# Patient Record
Sex: FEMALE | Race: WHITE | HISPANIC OR LATINO | Employment: OTHER | URBAN - METROPOLITAN AREA
[De-identification: names, ages, dates, MRNs, and addresses within clinical notes are randomized per-mention and may not be internally consistent; named-entity substitution may affect disease eponyms.]

---

## 2023-06-13 ENCOUNTER — OFFICE VISIT (OUTPATIENT)
Dept: PRIMARY CARE | Facility: CLINIC | Age: 88
End: 2023-06-13
Payer: MEDICARE

## 2023-06-13 VITALS
WEIGHT: 111 LBS | TEMPERATURE: 98.4 F | DIASTOLIC BLOOD PRESSURE: 80 MMHG | BODY MASS INDEX: 21.68 KG/M2 | SYSTOLIC BLOOD PRESSURE: 122 MMHG

## 2023-06-13 DIAGNOSIS — J03.90 TONSILLITIS: Primary | ICD-10-CM

## 2023-06-13 PROCEDURE — 1159F MED LIST DOCD IN RCRD: CPT | Performed by: FAMILY MEDICINE

## 2023-06-13 PROCEDURE — 99213 OFFICE O/P EST LOW 20 MIN: CPT | Performed by: FAMILY MEDICINE

## 2023-06-13 RX ORDER — ATORVASTATIN CALCIUM 20 MG/1
TABLET, FILM COATED ORAL
COMMUNITY
Start: 2020-11-11 | End: 2023-07-11 | Stop reason: SDUPTHER

## 2023-06-13 RX ORDER — HYDROCHLOROTHIAZIDE 25 MG/1
TABLET ORAL
COMMUNITY
Start: 2016-03-04 | End: 2023-07-11 | Stop reason: SDUPTHER

## 2023-06-13 RX ORDER — CELECOXIB 200 MG/1
200 CAPSULE ORAL 2 TIMES DAILY
Qty: 60 CAPSULE | Refills: 0 | Status: SHIPPED | OUTPATIENT
Start: 2023-06-13 | End: 2023-07-11 | Stop reason: ALTCHOICE

## 2023-06-13 RX ORDER — NIFEDIPINE 30 MG/1
1 TABLET, FILM COATED, EXTENDED RELEASE ORAL 2 TIMES DAILY
COMMUNITY
Start: 2018-06-29 | End: 2024-01-16 | Stop reason: ALTCHOICE

## 2023-06-13 RX ORDER — LOSARTAN POTASSIUM 100 MG/1
TABLET ORAL
COMMUNITY
Start: 2017-02-03 | End: 2023-07-11 | Stop reason: SDUPTHER

## 2023-06-13 RX ORDER — CELECOXIB 200 MG/1
CAPSULE ORAL
COMMUNITY
Start: 2023-02-01 | End: 2023-07-11 | Stop reason: ALTCHOICE

## 2023-06-13 NOTE — PROGRESS NOTES
Subjective   Patient ID: Rizwana Barton is a 89 y.o. female who presents for Sore Throat (EP. Here for sore throat and tonsils on right side. States has gone into ear.).  HPI  When swallows hurts throat and radiates to right ear, had this 3 weeks ago and went away but now back  No fever or cough    Objective   Visit Vitals  /80   Temp 36.9 °C (98.4 °F) (Oral)      Physical Exam  Alert, well-appearing.    HEENT: OP clear but broken tooth right back molar.  EACs and TMs clear.    Neck: Supple. No palpable masses or LAD.    CVS: RRR, no murmurs.     Respiratory: Normal inspirations and expirations. Clear and equal breath sounds.     Assessment/Plan   Diagnoses and all orders for this visit:  Tonsillitis  -     celecoxib (CeleBREX) 200 mg capsule; Take 1 capsule (200 mg) by mouth 2 times a day.    Patient is allergic to PCN (and also thinks she can not take amoxicillin) so I am leary to prescribe an antibiotic that I don't feel she absolutely needs, recommended NSAID (called in Celebrex for her) and salt water gargles and dental appointment ASAP      Mercedez Georges MD  Family Medicine   [facility]

## 2023-06-28 DIAGNOSIS — E78.5 HYPERLIPIDEMIA, UNSPECIFIED HYPERLIPIDEMIA TYPE: ICD-10-CM

## 2023-06-28 DIAGNOSIS — I10 HYPERTENSION, UNSPECIFIED TYPE: ICD-10-CM

## 2023-06-28 DIAGNOSIS — N18.30 STAGE 3 CHRONIC KIDNEY DISEASE, UNSPECIFIED WHETHER STAGE 3A OR 3B CKD (MULTI): ICD-10-CM

## 2023-06-28 DIAGNOSIS — Z00.00 MEDICARE ANNUAL WELLNESS VISIT, SUBSEQUENT: ICD-10-CM

## 2023-07-07 ENCOUNTER — LAB (OUTPATIENT)
Dept: LAB | Facility: LAB | Age: 88
End: 2023-07-07
Payer: MEDICARE

## 2023-07-07 DIAGNOSIS — N18.30 STAGE 3 CHRONIC KIDNEY DISEASE, UNSPECIFIED WHETHER STAGE 3A OR 3B CKD (MULTI): ICD-10-CM

## 2023-07-07 DIAGNOSIS — E78.5 HYPERLIPIDEMIA, UNSPECIFIED HYPERLIPIDEMIA TYPE: ICD-10-CM

## 2023-07-07 DIAGNOSIS — I10 HYPERTENSION, UNSPECIFIED TYPE: ICD-10-CM

## 2023-07-07 DIAGNOSIS — Z00.00 MEDICARE ANNUAL WELLNESS VISIT, SUBSEQUENT: ICD-10-CM

## 2023-07-07 LAB
ALANINE AMINOTRANSFERASE (SGPT) (U/L) IN SER/PLAS: 36 U/L (ref 7–45)
ALBUMIN (G/DL) IN SER/PLAS: 4.1 G/DL (ref 3.4–5)
ALKALINE PHOSPHATASE (U/L) IN SER/PLAS: 114 U/L (ref 33–136)
ANION GAP IN SER/PLAS: 11 MMOL/L (ref 10–20)
ASPARTATE AMINOTRANSFERASE (SGOT) (U/L) IN SER/PLAS: 32 U/L (ref 9–39)
BASOPHILS (10*3/UL) IN BLOOD BY AUTOMATED COUNT: 0.05 X10E9/L (ref 0–0.1)
BASOPHILS/100 LEUKOCYTES IN BLOOD BY AUTOMATED COUNT: 0.4 % (ref 0–2)
BILIRUBIN TOTAL (MG/DL) IN SER/PLAS: 0.9 MG/DL (ref 0–1.2)
CALCIUM (MG/DL) IN SER/PLAS: 9.7 MG/DL (ref 8.6–10.6)
CARBON DIOXIDE, TOTAL (MMOL/L) IN SER/PLAS: 30 MMOL/L (ref 21–32)
CHLORIDE (MMOL/L) IN SER/PLAS: 103 MMOL/L (ref 98–107)
CHOLESTEROL (MG/DL) IN SER/PLAS: 159 MG/DL (ref 0–199)
CHOLESTEROL IN HDL (MG/DL) IN SER/PLAS: 51.8 MG/DL
CHOLESTEROL/HDL RATIO: 3.1
CREATININE (MG/DL) IN SER/PLAS: 1.21 MG/DL (ref 0.5–1.05)
EOSINOPHILS (10*3/UL) IN BLOOD BY AUTOMATED COUNT: 0.18 X10E9/L (ref 0–0.4)
EOSINOPHILS/100 LEUKOCYTES IN BLOOD BY AUTOMATED COUNT: 1.6 % (ref 0–6)
ERYTHROCYTE DISTRIBUTION WIDTH (RATIO) BY AUTOMATED COUNT: 13.6 % (ref 11.5–14.5)
ERYTHROCYTE MEAN CORPUSCULAR HEMOGLOBIN CONCENTRATION (G/DL) BY AUTOMATED: 31.4 G/DL (ref 32–36)
ERYTHROCYTE MEAN CORPUSCULAR VOLUME (FL) BY AUTOMATED COUNT: 95 FL (ref 80–100)
ERYTHROCYTES (10*6/UL) IN BLOOD BY AUTOMATED COUNT: 4.42 X10E12/L (ref 4–5.2)
GFR FEMALE: 42 ML/MIN/1.73M2
GLUCOSE (MG/DL) IN SER/PLAS: 104 MG/DL (ref 74–99)
HEMATOCRIT (%) IN BLOOD BY AUTOMATED COUNT: 42.1 % (ref 36–46)
HEMOGLOBIN (G/DL) IN BLOOD: 13.2 G/DL (ref 12–16)
IMMATURE GRANULOCYTES/100 LEUKOCYTES IN BLOOD BY AUTOMATED COUNT: 0.3 % (ref 0–0.9)
LDL: 81 MG/DL (ref 0–99)
LEUKOCYTES (10*3/UL) IN BLOOD BY AUTOMATED COUNT: 11.2 X10E9/L (ref 4.4–11.3)
LYMPHOCYTES (10*3/UL) IN BLOOD BY AUTOMATED COUNT: 1.02 X10E9/L (ref 0.8–3)
LYMPHOCYTES/100 LEUKOCYTES IN BLOOD BY AUTOMATED COUNT: 9.1 % (ref 13–44)
MONOCYTES (10*3/UL) IN BLOOD BY AUTOMATED COUNT: 1.04 X10E9/L (ref 0.05–0.8)
MONOCYTES/100 LEUKOCYTES IN BLOOD BY AUTOMATED COUNT: 9.3 % (ref 2–10)
NEUTROPHILS (10*3/UL) IN BLOOD BY AUTOMATED COUNT: 8.89 X10E9/L (ref 1.6–5.5)
NEUTROPHILS/100 LEUKOCYTES IN BLOOD BY AUTOMATED COUNT: 79.3 % (ref 40–80)
NRBC (PER 100 WBCS) BY AUTOMATED COUNT: 0 /100 WBC (ref 0–0)
PLATELETS (10*3/UL) IN BLOOD AUTOMATED COUNT: 277 X10E9/L (ref 150–450)
POTASSIUM (MMOL/L) IN SER/PLAS: 4 MMOL/L (ref 3.5–5.3)
PROTEIN TOTAL: 6.7 G/DL (ref 6.4–8.2)
SODIUM (MMOL/L) IN SER/PLAS: 140 MMOL/L (ref 136–145)
THYROTROPIN (MIU/L) IN SER/PLAS BY DETECTION LIMIT <= 0.05 MIU/L: 2.09 MIU/L (ref 0.44–3.98)
TRIGLYCERIDE (MG/DL) IN SER/PLAS: 130 MG/DL (ref 0–149)
UREA NITROGEN (MG/DL) IN SER/PLAS: 22 MG/DL (ref 6–23)
VLDL: 26 MG/DL (ref 0–40)

## 2023-07-07 PROCEDURE — 84443 ASSAY THYROID STIM HORMONE: CPT

## 2023-07-07 PROCEDURE — 80061 LIPID PANEL: CPT

## 2023-07-07 PROCEDURE — 36415 COLL VENOUS BLD VENIPUNCTURE: CPT

## 2023-07-07 PROCEDURE — 80053 COMPREHEN METABOLIC PANEL: CPT

## 2023-07-07 PROCEDURE — 85025 COMPLETE CBC W/AUTO DIFF WBC: CPT

## 2023-07-11 ENCOUNTER — OFFICE VISIT (OUTPATIENT)
Dept: PRIMARY CARE | Facility: CLINIC | Age: 88
End: 2023-07-11
Payer: MEDICARE

## 2023-07-11 VITALS
DIASTOLIC BLOOD PRESSURE: 84 MMHG | HEIGHT: 61 IN | BODY MASS INDEX: 20.39 KG/M2 | SYSTOLIC BLOOD PRESSURE: 124 MMHG | TEMPERATURE: 97.9 F | WEIGHT: 108 LBS

## 2023-07-11 DIAGNOSIS — Z00.00 HEALTHCARE MAINTENANCE: Primary | ICD-10-CM

## 2023-07-11 DIAGNOSIS — I10 HYPERTENSION, UNSPECIFIED TYPE: ICD-10-CM

## 2023-07-11 DIAGNOSIS — Z12.31 BREAST CANCER SCREENING BY MAMMOGRAM: ICD-10-CM

## 2023-07-11 DIAGNOSIS — N18.32 STAGE 3B CHRONIC KIDNEY DISEASE (MULTI): ICD-10-CM

## 2023-07-11 DIAGNOSIS — E78.5 HYPERLIPIDEMIA, UNSPECIFIED HYPERLIPIDEMIA TYPE: ICD-10-CM

## 2023-07-11 PROCEDURE — 3074F SYST BP LT 130 MM HG: CPT | Performed by: FAMILY MEDICINE

## 2023-07-11 PROCEDURE — 1036F TOBACCO NON-USER: CPT | Performed by: FAMILY MEDICINE

## 2023-07-11 PROCEDURE — 3079F DIAST BP 80-89 MM HG: CPT | Performed by: FAMILY MEDICINE

## 2023-07-11 PROCEDURE — 1159F MED LIST DOCD IN RCRD: CPT | Performed by: FAMILY MEDICINE

## 2023-07-11 PROCEDURE — 1170F FXNL STATUS ASSESSED: CPT | Performed by: FAMILY MEDICINE

## 2023-07-11 PROCEDURE — G0439 PPPS, SUBSEQ VISIT: HCPCS | Performed by: FAMILY MEDICINE

## 2023-07-11 PROCEDURE — 99213 OFFICE O/P EST LOW 20 MIN: CPT | Performed by: FAMILY MEDICINE

## 2023-07-11 RX ORDER — ATORVASTATIN CALCIUM 20 MG/1
TABLET, FILM COATED ORAL
Qty: 90 TABLET | Refills: 1 | Status: CANCELLED | OUTPATIENT
Start: 2023-07-11

## 2023-07-11 RX ORDER — LOSARTAN POTASSIUM 100 MG/1
TABLET ORAL
Qty: 90 TABLET | Refills: 1 | Status: CANCELLED | OUTPATIENT
Start: 2023-07-11

## 2023-07-11 RX ORDER — HYDROCHLOROTHIAZIDE 25 MG/1
25 TABLET ORAL DAILY
Qty: 90 TABLET | Refills: 1 | Status: SHIPPED | OUTPATIENT
Start: 2023-07-11 | End: 2024-01-16

## 2023-07-11 RX ORDER — ATORVASTATIN CALCIUM 20 MG/1
20 TABLET, FILM COATED ORAL DAILY
Qty: 90 TABLET | Refills: 1 | Status: SHIPPED | OUTPATIENT
Start: 2023-07-11 | End: 2024-01-16 | Stop reason: SDUPTHER

## 2023-07-11 RX ORDER — LOSARTAN POTASSIUM 100 MG/1
100 TABLET ORAL DAILY
Qty: 90 TABLET | Refills: 1 | Status: SHIPPED | OUTPATIENT
Start: 2023-07-11 | End: 2024-01-16 | Stop reason: SDUPTHER

## 2023-07-11 RX ORDER — HYDROCHLOROTHIAZIDE 25 MG/1
TABLET ORAL
Qty: 90 TABLET | Refills: 1 | Status: CANCELLED | OUTPATIENT
Start: 2023-07-11

## 2023-07-11 ASSESSMENT — PATIENT HEALTH QUESTIONNAIRE - PHQ9
1. LITTLE INTEREST OR PLEASURE IN DOING THINGS: NOT AT ALL
2. FEELING DOWN, DEPRESSED OR HOPELESS: NOT AT ALL
SUM OF ALL RESPONSES TO PHQ9 QUESTIONS 1 AND 2: 0

## 2023-07-11 ASSESSMENT — ACTIVITIES OF DAILY LIVING (ADL)
DRESSING: INDEPENDENT
BATHING: INDEPENDENT
GROCERY_SHOPPING: INDEPENDENT
TAKING_MEDICATION: INDEPENDENT
DOING_HOUSEWORK: INDEPENDENT
MANAGING_FINANCES: INDEPENDENT

## 2023-07-11 NOTE — PROGRESS NOTES
Subjective   Patient ID: Rizwana Barton is a 89 y.o. female who presents for Medicare Annual Wellness Visit Subsequent (EP. Here for AMCR.).  HPI  Feels well, no specific complaints or concerns today except for arthritis    Review of Systems  General: no fever or night sweats, no change in weight  Eyes: no vision disturbance  ENT:  no mouth lesions, no sore throat, and no dysphagia  CV: no chest pain, no palpitations, no lower extremity edema  Resp: no shortness of breath, no cough  GI: no abdominal pain, no change in bowel habits  : no urinary problems  Skin; no rashes or new/changed skin lesions  Neuro: no headaches     Objective   There were no vitals taken for this visit.   Physical Exam  Alert, well-appearing.    HEENT: OP clear. Sclera white. Pupils equal and round. EACs and TMs clear.    Neck: Supple. No palpable masses. Thyroid was not enlarged.    CVS: RRR, no murmurs. No peripheral edema.    Respiratory: Normal inspirations and expirations. Clear and equal breath sounds.    GI: Soft, nontender, no masses or hepatosplenomegaly.     Assessment/Plan   Diagnoses and all orders for this visit:  Healthcare maintenance  Hyperlipidemia, unspecified hyperlipidemia type  Hypertension, unspecified type  Stage 3b chronic kidney disease    Follow up 6 mos     Mercedez Georges MD  Family Medicine   [facility]

## 2023-11-18 ENCOUNTER — TELEPHONE (OUTPATIENT)
Dept: PHARMACY | Facility: HOSPITAL | Age: 88
End: 2023-11-18
Payer: MEDICARE

## 2023-11-18 NOTE — TELEPHONE ENCOUNTER
Population Health: Outreach by Ambulatory Pharmacy Team    Payor: [employer] [place]  Reason: Adherence  Medication: Losartan 100 mg and Atorvastatin 20 mg   Outcome: No Answer/Invalid Number    Estefani Jordan, PharmD

## 2024-01-05 ENCOUNTER — OFFICE VISIT (OUTPATIENT)
Dept: PRIMARY CARE | Facility: CLINIC | Age: 89
End: 2024-01-05
Payer: MEDICARE

## 2024-01-05 VITALS
SYSTOLIC BLOOD PRESSURE: 120 MMHG | BODY MASS INDEX: 19.27 KG/M2 | DIASTOLIC BLOOD PRESSURE: 82 MMHG | WEIGHT: 102 LBS | TEMPERATURE: 97.8 F

## 2024-01-05 DIAGNOSIS — N18.32 STAGE 3B CHRONIC KIDNEY DISEASE (MULTI): ICD-10-CM

## 2024-01-05 DIAGNOSIS — M25.511 ACUTE PAIN OF RIGHT SHOULDER: ICD-10-CM

## 2024-01-05 DIAGNOSIS — R55 SYNCOPE AND COLLAPSE: Primary | ICD-10-CM

## 2024-01-05 PROCEDURE — 1125F AMNT PAIN NOTED PAIN PRSNT: CPT | Performed by: FAMILY MEDICINE

## 2024-01-05 PROCEDURE — 1036F TOBACCO NON-USER: CPT | Performed by: FAMILY MEDICINE

## 2024-01-05 PROCEDURE — 99214 OFFICE O/P EST MOD 30 MIN: CPT | Performed by: FAMILY MEDICINE

## 2024-01-05 PROCEDURE — 1159F MED LIST DOCD IN RCRD: CPT | Performed by: FAMILY MEDICINE

## 2024-01-05 ASSESSMENT — PAIN SCALES - GENERAL: PAINLEVEL: 8

## 2024-01-05 NOTE — PROGRESS NOTES
Subjective   Patient ID: Rizwana Barton is a 89 y.o. female who presents for Shoulder Pain (EP. Here for Right shoulder pain.  Has had 2 falls recently on Wednesday and Today.).  HPI  Sick with flu and cough, sore throat, and fever last week, feels better  Passed out Wednesday and fell, hurt right shoulder, happened again last night when got up to use the bathroom    Can't life up her arm at all, very painful  No appetite for the next few days    Objective   Visit Vitals  /82   Temp 36.6 °C (97.8 °F) (Oral)      Physical Exam  Alert, no acute distress but uncomfortable/in pain especially with exam of right shoulder     Neck: Supple. No LAD.     CVS: RRR, no murmurs.     Respiratory: Normal inspirations and expirations. Clear and equal breath sounds.     Assessment/Plan   Diagnoses and all orders for this visit:  Syncope and collapse  Acute pain of right shoulder      To ER for further evaluation    Hold nifedine and only take 1/2 losartan 100 mg (has lost weight- 6# over the last 6 mos and maybe having episodes of hypotension)    Mercedez Georges MD  Family Medicine   [facility]

## 2024-01-09 ENCOUNTER — PATIENT OUTREACH (OUTPATIENT)
Dept: PRIMARY CARE | Facility: CLINIC | Age: 89
End: 2024-01-09
Payer: MEDICARE

## 2024-01-09 RX ORDER — AMLODIPINE BESYLATE 2.5 MG/1
2.5 TABLET ORAL
COMMUNITY
Start: 2024-01-09 | End: 2024-01-16 | Stop reason: SDUPTHER

## 2024-01-09 RX ORDER — ACETAMINOPHEN 325 MG/1
650 TABLET ORAL EVERY 6 HOURS PRN
COMMUNITY
Start: 2019-05-08

## 2024-01-09 RX ORDER — SULFAMETHOXAZOLE AND TRIMETHOPRIM 800; 160 MG/1; MG/1
1 TABLET ORAL
COMMUNITY
Start: 2024-01-09 | End: 2024-01-12

## 2024-01-09 NOTE — PROGRESS NOTES
Discharge Facility: [facility]  Discharge Diagnosis: Syncope  Admission Date: 1/5/24  Discharge Date:  1/8/24    PCP Appointment Date: 1/16/24  Specialist Appointment Date:   - Dr. Tomas Anderson (Ortho): 1/30/23 for clavicle fracture  Hospital Encounter and Summary: Linked   See discharge assessment below for further details    Medications  Medications reviewed with patient/caregiver?: Yes (see med list) (1/9/2024  1:50 PM)  Is the patient having any side effects they believe may be caused by any medication additions or changes?: No (1/9/2024  1:50 PM)  Does the patient have all medications ordered at discharge?: Yes (1/9/2024  1:50 PM)  Care Management Interventions: No intervention needed (1/9/2024  1:50 PM)  Prescription Comments: START: amlodipine 2.5mg daily, bactrim DS x3 days  CHANGE: losartan 100mg daily  STOP: hydrochlorothiazide 25mg daily (1/9/2024  1:50 PM)  Is the patient taking all medications as directed (includes completed medication regime)?: Yes (1/9/2024  1:50 PM)  Care Management Interventions: Provided patient education (1/9/2024  1:50 PM)    Appointments  Does the patient have a primary care provider?: Yes (1/9/2024  1:50 PM)  Care Management Interventions: Verified appointment date/time/provider (1/9/2024  1:50 PM)  Care Management Interventions: Advised patient to keep appointment (1/9/2024  1:50 PM)    Self Management  Has home health visited the patient within 72 hours of discharge?: Not applicable (1/9/2024  1:50 PM)    Patient Teaching  Does the patient have access to their discharge instructions?: Yes (1/9/2024  1:50 PM)  Care Management Interventions: Reviewed instructions with patient (1/9/2024  1:50 PM)  What is the patient's perception of their health status since discharge?: Improving (1/9/2024  1:50 PM)  Is the patient/caregiver able to teach back the hierarchy of who to call/visit for symptoms/problems? PCP, Specialist, Home Health nurse, Urgent Care, ED, 911: Yes (1/9/2024  1:50  PM)  Patient/Caregiver Education Comments: Spoke with patient daughter Salo who reports patient is doing better. She has her arm in a sling and is scheduled to follow up with ortho at the end of the month. States her breathing has been fine and she wasnt showing symptoms of covid. States she had a fever prior to going into the hospital but they were shocked she tested positive for covid. Assisted to scheudule follow up with PCP. Denies additonal needs at this time. (1/9/2024  1:50 PM)

## 2024-01-16 ENCOUNTER — OFFICE VISIT (OUTPATIENT)
Dept: PRIMARY CARE | Facility: CLINIC | Age: 89
End: 2024-01-16
Payer: MEDICARE

## 2024-01-16 VITALS
WEIGHT: 106 LBS | DIASTOLIC BLOOD PRESSURE: 82 MMHG | BODY MASS INDEX: 20.03 KG/M2 | TEMPERATURE: 98.2 F | SYSTOLIC BLOOD PRESSURE: 128 MMHG

## 2024-01-16 DIAGNOSIS — N30.00 ACUTE CYSTITIS WITHOUT HEMATURIA: ICD-10-CM

## 2024-01-16 DIAGNOSIS — I10 HYPERTENSION, UNSPECIFIED TYPE: ICD-10-CM

## 2024-01-16 DIAGNOSIS — E78.5 HYPERLIPIDEMIA, UNSPECIFIED HYPERLIPIDEMIA TYPE: ICD-10-CM

## 2024-01-16 DIAGNOSIS — S42.031D CLOSED DISPLACED FRACTURE OF ACROMIAL END OF RIGHT CLAVICLE WITH ROUTINE HEALING, SUBSEQUENT ENCOUNTER: Primary | ICD-10-CM

## 2024-01-16 DIAGNOSIS — M15.9 OSTEOARTHRITIS OF MULTIPLE JOINTS, UNSPECIFIED OSTEOARTHRITIS TYPE: ICD-10-CM

## 2024-01-16 LAB
POC APPEARANCE, URINE: ABNORMAL
POC BILIRUBIN, URINE: NEGATIVE
POC BLOOD, URINE: ABNORMAL
POC COLOR, URINE: YELLOW
POC GLUCOSE, URINE: NEGATIVE MG/DL
POC KETONES, URINE: NEGATIVE MG/DL
POC LEUKOCYTES, URINE: ABNORMAL
POC NITRITE,URINE: NEGATIVE
POC PH, URINE: 6.5 PH
POC PROTEIN, URINE: NEGATIVE MG/DL
POC SPECIFIC GRAVITY, URINE: >=1.03
POC UROBILINOGEN, URINE: 0.2 EU/DL

## 2024-01-16 PROCEDURE — 1159F MED LIST DOCD IN RCRD: CPT | Performed by: FAMILY MEDICINE

## 2024-01-16 PROCEDURE — 1036F TOBACCO NON-USER: CPT | Performed by: FAMILY MEDICINE

## 2024-01-16 PROCEDURE — 87086 URINE CULTURE/COLONY COUNT: CPT

## 2024-01-16 PROCEDURE — 1125F AMNT PAIN NOTED PAIN PRSNT: CPT | Performed by: FAMILY MEDICINE

## 2024-01-16 PROCEDURE — 3079F DIAST BP 80-89 MM HG: CPT | Performed by: FAMILY MEDICINE

## 2024-01-16 PROCEDURE — 99496 TRANSJ CARE MGMT HIGH F2F 7D: CPT | Performed by: FAMILY MEDICINE

## 2024-01-16 PROCEDURE — 3074F SYST BP LT 130 MM HG: CPT | Performed by: FAMILY MEDICINE

## 2024-01-16 PROCEDURE — 81003 URINALYSIS AUTO W/O SCOPE: CPT | Performed by: FAMILY MEDICINE

## 2024-01-16 RX ORDER — LOSARTAN POTASSIUM 100 MG/1
100 TABLET ORAL DAILY
Qty: 90 TABLET | Refills: 1 | Status: SHIPPED | OUTPATIENT
Start: 2024-01-16 | End: 2024-05-24 | Stop reason: SDUPTHER

## 2024-01-16 RX ORDER — AMLODIPINE BESYLATE 2.5 MG/1
2.5 TABLET ORAL
Qty: 90 TABLET | Refills: 1 | Status: SHIPPED | OUTPATIENT
Start: 2024-01-16 | End: 2024-04-02 | Stop reason: SDUPTHER

## 2024-01-16 RX ORDER — ATORVASTATIN CALCIUM 20 MG/1
20 TABLET, FILM COATED ORAL DAILY
Qty: 90 TABLET | Refills: 1 | Status: SHIPPED | OUTPATIENT
Start: 2024-01-16 | End: 2024-05-24 | Stop reason: SDUPTHER

## 2024-01-16 NOTE — PROGRESS NOTES
Subjective   Patient ID: Rizwana Barton is a 89 y.o. female who presents for No chief complaint on file..  HPI  Patient was admitted to the hospital on 1/5/24 and discharged on 1/8/24    2 days prior she had a syncopal episode and fell and had come to my office on 1/5 for further evaluation-I was concerned both by the syncopal episode and her shoulder exam and pain she was experiencing with her shoulder and so asked her to go to the ER for further evaluation  X-ray showed a mildly displaced fracture of the distal right clavicle, she was also found to have a UTI and COVID, she was given IV fluids antibiotics and remdesivir, Ortho was consulted for the clavicle fracture and she was to continue to wear sling until her follow-up appointment with Ortho (has appointment end of January)    Was discharged on amlodipine 2.5 mg daily    Only taking Tylenol for the pain     Recalls getting ill around JEREL  No respiratory symptoms now    Appetite is back  Review of Systems  Genl: The patient has been in good health without recent weight change, fevers, or night sweats  CVS: No chest pain, irregular heartbeat, shortness of breath, or swollen ankles  Resp: No cough or difficulty breathing  GI: No change in bowel habits or abdominal pain.   : No urinary problems. Finished antibiotic for UTI.   Objective   Visit Vitals  /82   Temp 36.8 °C (98.2 °F) (Oral)      Physical Exam  Alert, well-appearing.    CVS: RRR, no murmurs. No peripheral edema.    Respiratory: Normal inspirations and expirations. Clear and equal breath sounds.    GI: Soft, nontender.     Assessment/Plan   Diagnoses and all orders for this visit:  Closed displaced fracture of acromial end of right clavicle with routine healing, subsequent encounter  Hypertension, unspecified type  -     losartan (Cozaar) 100 mg tablet; Take 1 tablet (100 mg) by mouth once daily.  -     amLODIPine (Norvasc) 2.5 mg tablet; Take 1 tablet (2.5 mg) by mouth once  daily.  Hyperlipidemia, unspecified hyperlipidemia type  -     atorvastatin (Lipitor) 20 mg tablet; Take 1 tablet (20 mg) by mouth once daily.  Osteoarthritis of multiple joints, unspecified osteoarthritis type  -     Disability Placard    Call with any issues (pain, blood pressure issues etc)    Mercedez Georges MD  Family Medicine   [facility]

## 2024-01-17 ENCOUNTER — TELEPHONE (OUTPATIENT)
Dept: PRIMARY CARE | Facility: CLINIC | Age: 89
End: 2024-01-17
Payer: MEDICARE

## 2024-01-17 NOTE — TELEPHONE ENCOUNTER
Pt daughter called in stating pt was supposed to have an antibiotic for UTI sent to pharmacy and it wasn't sent over. Pt wants it sent to [facility].

## 2024-01-18 LAB — BACTERIA UR CULT: NORMAL

## 2024-01-19 ENCOUNTER — PATIENT OUTREACH (OUTPATIENT)
Dept: PRIMARY CARE | Facility: CLINIC | Age: 89
End: 2024-01-19
Payer: MEDICARE

## 2024-01-19 NOTE — PROGRESS NOTES
Unable to reach patient for call back after patient's follow up appointment with PCP.   ROSE MARYM with call back number for patient to call if needed   If no voicemail available call attempts x 2 were made to contact the patient to assist with any questions or concerns patient may have.

## 2024-02-29 ENCOUNTER — PATIENT OUTREACH (OUTPATIENT)
Dept: PRIMARY CARE | Facility: CLINIC | Age: 89
End: 2024-02-29
Payer: MEDICARE

## 2024-03-12 ENCOUNTER — TELEPHONE (OUTPATIENT)
Dept: PRIMARY CARE | Facility: CLINIC | Age: 89
End: 2024-03-12
Payer: MEDICARE

## 2024-03-12 NOTE — TELEPHONE ENCOUNTER
Spoke to Daughter and patient was taking Losartan 100 mg BID and Amlodipine 2.5 mg just daily.  I advised Daughter that she is to be taking the Amlodipine 2.5 BID to equal 5 mg and thee Losartan 100 mg only 1x  daily.

## 2024-03-12 NOTE — TELEPHONE ENCOUNTER
Spoke to daughter regarding some elevated blood pressures of 145///70's. She has no other symptoms and daughter is asking  if she needs to come back in for follow up don this.

## 2024-04-02 DIAGNOSIS — I10 HYPERTENSION, UNSPECIFIED TYPE: ICD-10-CM

## 2024-04-02 RX ORDER — AMLODIPINE BESYLATE 2.5 MG/1
2.5 TABLET ORAL
Qty: 90 TABLET | Refills: 0 | Status: SHIPPED | OUTPATIENT
Start: 2024-04-02 | End: 2024-06-06 | Stop reason: WASHOUT

## 2024-04-02 NOTE — TELEPHONE ENCOUNTER
Pt daughter walked in requesting short supply of   Amlodipine 2.5 mg   Completely out  [facility]   Next appt: None   Last appt: 7/11/23

## 2024-05-01 DIAGNOSIS — E78.5 HYPERLIPIDEMIA, UNSPECIFIED HYPERLIPIDEMIA TYPE: ICD-10-CM

## 2024-05-01 DIAGNOSIS — I10 HYPERTENSION, UNSPECIFIED TYPE: ICD-10-CM

## 2024-05-01 PROBLEM — R53.83 FATIGUE: Status: ACTIVE | Noted: 2024-05-01

## 2024-05-01 PROBLEM — G62.9 PERIPHERAL NEUROPATHY: Status: ACTIVE | Noted: 2024-05-01

## 2024-05-01 PROBLEM — G47.00 INSOMNIA: Status: ACTIVE | Noted: 2024-05-01

## 2024-05-01 PROBLEM — R91.8 PULMONARY NODULES: Status: ACTIVE | Noted: 2024-05-01

## 2024-05-10 ENCOUNTER — LAB (OUTPATIENT)
Dept: LAB | Facility: LAB | Age: 89
End: 2024-05-10
Payer: MEDICARE

## 2024-05-10 DIAGNOSIS — I10 HYPERTENSION, UNSPECIFIED TYPE: ICD-10-CM

## 2024-05-10 DIAGNOSIS — E78.5 HYPERLIPIDEMIA, UNSPECIFIED HYPERLIPIDEMIA TYPE: ICD-10-CM

## 2024-05-10 LAB
ALBUMIN SERPL BCP-MCNC: 4.1 G/DL (ref 3.4–5)
ALP SERPL-CCNC: 90 U/L (ref 33–136)
ALT SERPL W P-5'-P-CCNC: 15 U/L (ref 7–45)
ANION GAP SERPL CALC-SCNC: 13 MMOL/L (ref 10–20)
AST SERPL W P-5'-P-CCNC: 15 U/L (ref 9–39)
BILIRUB SERPL-MCNC: 0.7 MG/DL (ref 0–1.2)
BUN SERPL-MCNC: 22 MG/DL (ref 6–23)
CALCIUM SERPL-MCNC: 9.7 MG/DL (ref 8.6–10.6)
CHLORIDE SERPL-SCNC: 106 MMOL/L (ref 98–107)
CHOLEST SERPL-MCNC: 190 MG/DL (ref 0–199)
CHOLESTEROL/HDL RATIO: 2.5
CO2 SERPL-SCNC: 28 MMOL/L (ref 21–32)
CREAT SERPL-MCNC: 1.05 MG/DL (ref 0.5–1.05)
EGFRCR SERPLBLD CKD-EPI 2021: 50 ML/MIN/1.73M*2
GLUCOSE SERPL-MCNC: 97 MG/DL (ref 74–99)
HDLC SERPL-MCNC: 77.4 MG/DL
LDLC SERPL CALC-MCNC: 92 MG/DL
NON HDL CHOLESTEROL: 113 MG/DL (ref 0–149)
POTASSIUM SERPL-SCNC: 3.9 MMOL/L (ref 3.5–5.3)
PROT SERPL-MCNC: 6.6 G/DL (ref 6.4–8.2)
SODIUM SERPL-SCNC: 143 MMOL/L (ref 136–145)
TRIGL SERPL-MCNC: 102 MG/DL (ref 0–149)
VLDL: 20 MG/DL (ref 0–40)

## 2024-05-10 PROCEDURE — 80053 COMPREHEN METABOLIC PANEL: CPT

## 2024-05-10 PROCEDURE — 80061 LIPID PANEL: CPT

## 2024-05-10 PROCEDURE — 36415 COLL VENOUS BLD VENIPUNCTURE: CPT

## 2024-05-17 ENCOUNTER — APPOINTMENT (OUTPATIENT)
Dept: PRIMARY CARE | Facility: CLINIC | Age: 89
End: 2024-05-17
Payer: MEDICARE

## 2024-05-24 ENCOUNTER — OFFICE VISIT (OUTPATIENT)
Dept: PRIMARY CARE | Facility: CLINIC | Age: 89
End: 2024-05-24
Payer: MEDICARE

## 2024-05-24 VITALS
OXYGEN SATURATION: 98 % | HEART RATE: 74 BPM | WEIGHT: 110 LBS | TEMPERATURE: 97.6 F | DIASTOLIC BLOOD PRESSURE: 72 MMHG | BODY MASS INDEX: 20.78 KG/M2 | SYSTOLIC BLOOD PRESSURE: 124 MMHG

## 2024-05-24 DIAGNOSIS — E78.5 HYPERLIPIDEMIA, UNSPECIFIED HYPERLIPIDEMIA TYPE: ICD-10-CM

## 2024-05-24 DIAGNOSIS — I10 HYPERTENSION, UNSPECIFIED TYPE: ICD-10-CM

## 2024-05-24 PROCEDURE — 3074F SYST BP LT 130 MM HG: CPT | Performed by: FAMILY MEDICINE

## 2024-05-24 PROCEDURE — 3078F DIAST BP <80 MM HG: CPT | Performed by: FAMILY MEDICINE

## 2024-05-24 PROCEDURE — 1159F MED LIST DOCD IN RCRD: CPT | Performed by: FAMILY MEDICINE

## 2024-05-24 PROCEDURE — 1157F ADVNC CARE PLAN IN RCRD: CPT | Performed by: FAMILY MEDICINE

## 2024-05-24 PROCEDURE — 99214 OFFICE O/P EST MOD 30 MIN: CPT | Performed by: FAMILY MEDICINE

## 2024-05-24 PROCEDURE — 1036F TOBACCO NON-USER: CPT | Performed by: FAMILY MEDICINE

## 2024-05-24 RX ORDER — LOSARTAN POTASSIUM 100 MG/1
100 TABLET ORAL DAILY
Qty: 90 TABLET | Refills: 1 | Status: SHIPPED | OUTPATIENT
Start: 2024-05-24

## 2024-05-24 RX ORDER — AMLODIPINE BESYLATE 2.5 MG/1
2.5 TABLET ORAL 2 TIMES DAILY
Qty: 180 TABLET | Refills: 0 | Status: CANCELLED | OUTPATIENT
Start: 2024-05-24

## 2024-05-24 RX ORDER — ATORVASTATIN CALCIUM 20 MG/1
20 TABLET, FILM COATED ORAL DAILY
Qty: 90 TABLET | Refills: 1 | Status: SHIPPED | OUTPATIENT
Start: 2024-05-24

## 2024-05-24 RX ORDER — AMLODIPINE BESYLATE 10 MG/1
10 TABLET ORAL DAILY
Qty: 30 TABLET | Refills: 1 | Status: SHIPPED | OUTPATIENT
Start: 2024-05-24 | End: 2024-06-06 | Stop reason: SDUPTHER

## 2024-05-24 NOTE — PROGRESS NOTES
Subjective   Patient ID: Rizwana Barton is a 89 y.o. female who presents for Follow-up (EP. Here for follow up and medication.).  HPI  Feels well, no specific complaints or concerns today except for some fatigue and blood pressures have been variable- from 160 down to 1teens, on average 140s-150s    Mid-March dose of amlodipine was increased from 2.5 mg daily to bid    Denies any swelling in ankles/feet, no chest pain or dyspnea  Review of Systems  General: no fever or night sweats, no change in weight  Eyes: no vision disturbance  ENT: no mouth lesions, no sore throat, and no dysphagia  CV: no chest pain, no palpitations, no lower extremity edema  Resp: no shortness of breath, no cough  GI: no abdominal pain, no change in bowel habits  : no urinary problems  Neuro: no headaches     Objective   /72   Pulse 74   Temp 36.4 °C (97.6 °F) (Oral)   Wt 49.9 kg (110 lb)   SpO2 98%   BMI 20.78 kg/m²    Physical Exam  Alert, well-appearing.    Neck: Supple. No palpable masses. Thyroid was not enlarged.    CVS: RRR, no murmurs. No peripheral edema.    Respiratory: Clear and equal breath sounds.    GI: Soft, nontender, no masses or hepatosplenomegaly.     Assessment/Plan   Diagnoses and all orders for this visit:  Hypertension, unspecified type  Hyperlipidemia, unspecified hyperlipidemia type    To call in 10-14 days with bp readings - will see if increasing dose of amlodipine to 10 mg helps her blood pressure but not cause side effects (namely lower ext swelling)      Mercedez Georges MD  Family Medicine   [facility]

## 2024-06-06 ENCOUNTER — HOSPITAL ENCOUNTER (OUTPATIENT)
Dept: RADIOLOGY | Facility: CLINIC | Age: 89
Discharge: HOME | End: 2024-06-06
Payer: MEDICARE

## 2024-06-06 ENCOUNTER — OFFICE VISIT (OUTPATIENT)
Dept: PRIMARY CARE | Facility: CLINIC | Age: 89
End: 2024-06-06
Payer: MEDICARE

## 2024-06-06 VITALS
HEART RATE: 98 BPM | TEMPERATURE: 97.6 F | DIASTOLIC BLOOD PRESSURE: 82 MMHG | BODY MASS INDEX: 21.16 KG/M2 | OXYGEN SATURATION: 99 % | WEIGHT: 112 LBS | SYSTOLIC BLOOD PRESSURE: 124 MMHG

## 2024-06-06 DIAGNOSIS — M54.50 ACUTE RIGHT-SIDED LOW BACK PAIN WITHOUT SCIATICA: ICD-10-CM

## 2024-06-06 DIAGNOSIS — M54.50 ACUTE RIGHT-SIDED LOW BACK PAIN WITHOUT SCIATICA: Primary | ICD-10-CM

## 2024-06-06 DIAGNOSIS — I10 HYPERTENSION, UNSPECIFIED TYPE: ICD-10-CM

## 2024-06-06 PROCEDURE — 3074F SYST BP LT 130 MM HG: CPT | Performed by: FAMILY MEDICINE

## 2024-06-06 PROCEDURE — 1159F MED LIST DOCD IN RCRD: CPT | Performed by: FAMILY MEDICINE

## 2024-06-06 PROCEDURE — 72110 X-RAY EXAM L-2 SPINE 4/>VWS: CPT

## 2024-06-06 PROCEDURE — 99213 OFFICE O/P EST LOW 20 MIN: CPT | Performed by: FAMILY MEDICINE

## 2024-06-06 PROCEDURE — 1036F TOBACCO NON-USER: CPT | Performed by: FAMILY MEDICINE

## 2024-06-06 PROCEDURE — 3079F DIAST BP 80-89 MM HG: CPT | Performed by: FAMILY MEDICINE

## 2024-06-06 PROCEDURE — 72110 X-RAY EXAM L-2 SPINE 4/>VWS: CPT | Performed by: RADIOLOGY

## 2024-06-06 PROCEDURE — 1157F ADVNC CARE PLAN IN RCRD: CPT | Performed by: FAMILY MEDICINE

## 2024-06-06 RX ORDER — AMLODIPINE BESYLATE 10 MG/1
10 TABLET ORAL DAILY
Qty: 90 TABLET | Refills: 1 | Status: SHIPPED | OUTPATIENT
Start: 2024-06-06 | End: 2024-12-03

## 2024-06-06 RX ORDER — CELECOXIB 200 MG/1
200 CAPSULE ORAL 2 TIMES DAILY
Qty: 30 CAPSULE | Refills: 0 | Status: SHIPPED | OUTPATIENT
Start: 2024-06-06 | End: 2024-12-03

## 2024-06-06 RX ORDER — HYDROCODONE BITARTRATE AND ACETAMINOPHEN 5; 325 MG/1; MG/1
1 TABLET ORAL EVERY 6 HOURS PRN
Qty: 20 TABLET | Refills: 0 | Status: SHIPPED | OUTPATIENT
Start: 2024-06-06 | End: 2024-06-13

## 2024-06-06 NOTE — PROGRESS NOTES
Subjective   Patient ID: Rizwana Barton is a 89 y.o. female who presents for Back Pain (EP. Here for severe lower Right side back pain and spasms, States was trying to reposition . ).  HPI  2 days ago hurt back (right lower back) while stooping and picking up  to reposition it    Sleeping ok  No changes in urination or bowel movements    Also been taking amlodipine 10 mg- bps been better, no extremity edema   Objective   /82   Pulse 98   Temp 36.4 °C (97.6 °F) (Oral)   Wt 50.8 kg (112 lb)   SpO2 99%   BMI 21.16 kg/m²    Physical Exam  Alert, obvious discomfort in back and difficult to get out of chair and sit back down, walking ok but very slowly     CVS: RRR, no murmurs. No peripheral edema.    Respiratory: Normal inspirations and expirations. Clear and equal breath sounds.    Back tenderness low paralumbar area  Assessment/Plan   Diagnoses and all orders for this visit:  Acute right-sided low back pain without sciatica  -     XR lumbar spine 2-3 views; Future  -     celecoxib (CeleBREX) 200 mg capsule; Take 1 capsule (200 mg) by mouth 2 times a day.  -     HYDROcodone-acetaminophen (Norco) 5-325 mg tablet; Take 1 tablet by mouth every 6 hours if needed for severe pain (7 - 10) for up to 7 days.  Hypertension, unspecified type  -     amLODIPine (Norvasc) 10 mg tablet; Take 1 tablet (10 mg) by mouth once daily.          Mercedez Georges MD  Family Medicine   [facility]

## 2024-06-18 DIAGNOSIS — M54.50 ACUTE RIGHT-SIDED LOW BACK PAIN WITHOUT SCIATICA: ICD-10-CM

## 2024-06-18 NOTE — TELEPHONE ENCOUNTER
Daughter ldm on machine, needs refill on  Celecoxib 200 mg  [facility] [place]  Last appt. 5/24/24  Upcoming appt. 8/30/24  Last BW 5/10/24

## 2024-06-19 RX ORDER — CELECOXIB 200 MG/1
200 CAPSULE ORAL 2 TIMES DAILY
Qty: 60 CAPSULE | Refills: 0 | Status: SHIPPED | OUTPATIENT
Start: 2024-06-19 | End: 2024-12-16

## 2024-07-02 ENCOUNTER — TELEPHONE (OUTPATIENT)
Dept: PRIMARY CARE | Facility: CLINIC | Age: 89
End: 2024-07-02
Payer: MEDICARE

## 2024-07-02 DIAGNOSIS — M54.50 ACUTE RIGHT-SIDED LOW BACK PAIN WITHOUT SCIATICA: ICD-10-CM

## 2024-07-02 RX ORDER — CELECOXIB 200 MG/1
200 CAPSULE ORAL DAILY
Qty: 90 CAPSULE | Refills: 0 | Status: SHIPPED | OUTPATIENT
Start: 2024-07-02 | End: 2024-12-29

## 2024-07-02 NOTE — TELEPHONE ENCOUNTER
Spoke w/Salo (daughter), she would like to take 1 pill a day instead of 2 pills a day on the Celecoxib 200 mg. Needs a refill.  Celecoxib 200 mg  Send to [facility]  Last appt. 5/24/24  Upcoming appt. 8/30/24  Last BW 5/10/24    *Please call daughter if she can change to 1 pill- Salo # [phone]*

## 2024-07-03 NOTE — TELEPHONE ENCOUNTER
Called luis a on both Rizwana and her daughters v/m.  Requested a return call to discuss.     Daughter called and instructions were given.

## 2024-08-19 DIAGNOSIS — I10 HYPERTENSION, UNSPECIFIED TYPE: ICD-10-CM

## 2024-08-19 DIAGNOSIS — N18.32 STAGE 3B CHRONIC KIDNEY DISEASE (MULTI): ICD-10-CM

## 2024-08-19 DIAGNOSIS — M81.0 OSTEOPOROSIS, UNSPECIFIED OSTEOPOROSIS TYPE, UNSPECIFIED PATHOLOGICAL FRACTURE PRESENCE: ICD-10-CM

## 2024-08-19 DIAGNOSIS — Z00.00 ENCOUNTER FOR MEDICARE ANNUAL WELLNESS EXAM: ICD-10-CM

## 2024-08-19 DIAGNOSIS — R53.83 FATIGUE, UNSPECIFIED TYPE: ICD-10-CM

## 2024-08-19 DIAGNOSIS — E78.5 HYPERLIPIDEMIA, UNSPECIFIED HYPERLIPIDEMIA TYPE: ICD-10-CM

## 2024-08-23 ENCOUNTER — HOSPITAL ENCOUNTER (OUTPATIENT)
Dept: RADIOLOGY | Facility: CLINIC | Age: 89
Discharge: HOME | End: 2024-08-23
Payer: MEDICARE

## 2024-08-23 DIAGNOSIS — I10 HYPERTENSION, UNSPECIFIED TYPE: ICD-10-CM

## 2024-08-23 DIAGNOSIS — E78.5 HYPERLIPIDEMIA, UNSPECIFIED HYPERLIPIDEMIA TYPE: ICD-10-CM

## 2024-08-23 DIAGNOSIS — R53.83 FATIGUE, UNSPECIFIED TYPE: ICD-10-CM

## 2024-08-23 DIAGNOSIS — Z00.00 ENCOUNTER FOR MEDICARE ANNUAL WELLNESS EXAM: ICD-10-CM

## 2024-08-23 DIAGNOSIS — N18.32 STAGE 3B CHRONIC KIDNEY DISEASE (MULTI): ICD-10-CM

## 2024-08-23 DIAGNOSIS — M81.0 OSTEOPOROSIS, UNSPECIFIED OSTEOPOROSIS TYPE, UNSPECIFIED PATHOLOGICAL FRACTURE PRESENCE: ICD-10-CM

## 2024-08-23 PROCEDURE — 77080 DXA BONE DENSITY AXIAL: CPT

## 2024-08-23 PROCEDURE — 77080 DXA BONE DENSITY AXIAL: CPT | Performed by: STUDENT IN AN ORGANIZED HEALTH CARE EDUCATION/TRAINING PROGRAM

## 2024-08-23 ASSESSMENT — LIFESTYLE VARIABLES
3_OR_MORE_DRINKS_PER_DAY: N
CURRENT_SMOKER: N

## 2024-08-27 ENCOUNTER — LAB (OUTPATIENT)
Dept: LAB | Facility: LAB | Age: 89
End: 2024-08-27
Payer: MEDICARE

## 2024-08-27 DIAGNOSIS — R53.83 FATIGUE, UNSPECIFIED TYPE: ICD-10-CM

## 2024-08-27 DIAGNOSIS — Z00.00 ENCOUNTER FOR MEDICARE ANNUAL WELLNESS EXAM: ICD-10-CM

## 2024-08-27 DIAGNOSIS — M81.0 OSTEOPOROSIS, UNSPECIFIED OSTEOPOROSIS TYPE, UNSPECIFIED PATHOLOGICAL FRACTURE PRESENCE: ICD-10-CM

## 2024-08-27 DIAGNOSIS — I10 HYPERTENSION, UNSPECIFIED TYPE: ICD-10-CM

## 2024-08-27 DIAGNOSIS — E78.5 HYPERLIPIDEMIA, UNSPECIFIED HYPERLIPIDEMIA TYPE: ICD-10-CM

## 2024-08-27 DIAGNOSIS — N18.32 STAGE 3B CHRONIC KIDNEY DISEASE (MULTI): ICD-10-CM

## 2024-08-27 LAB
ALBUMIN SERPL BCP-MCNC: 3.9 G/DL (ref 3.4–5)
ALP SERPL-CCNC: 87 U/L (ref 33–136)
ALT SERPL W P-5'-P-CCNC: 14 U/L (ref 7–45)
ANION GAP SERPL CALC-SCNC: 15 MMOL/L (ref 10–20)
AST SERPL W P-5'-P-CCNC: 16 U/L (ref 9–39)
BASOPHILS # BLD AUTO: 0.07 X10*3/UL (ref 0–0.1)
BASOPHILS NFR BLD AUTO: 0.8 %
BILIRUB SERPL-MCNC: 0.8 MG/DL (ref 0–1.2)
BUN SERPL-MCNC: 21 MG/DL (ref 6–23)
CALCIUM SERPL-MCNC: 9.5 MG/DL (ref 8.6–10.6)
CHLORIDE SERPL-SCNC: 107 MMOL/L (ref 98–107)
CHOLEST SERPL-MCNC: 190 MG/DL (ref 0–199)
CHOLESTEROL/HDL RATIO: 2.7
CO2 SERPL-SCNC: 25 MMOL/L (ref 21–32)
CREAT SERPL-MCNC: 1.03 MG/DL (ref 0.5–1.05)
EGFRCR SERPLBLD CKD-EPI 2021: 51 ML/MIN/1.73M*2
EOSINOPHIL # BLD AUTO: 0.42 X10*3/UL (ref 0–0.4)
EOSINOPHIL NFR BLD AUTO: 5 %
ERYTHROCYTE [DISTWIDTH] IN BLOOD BY AUTOMATED COUNT: 14.1 % (ref 11.5–14.5)
GLUCOSE SERPL-MCNC: 96 MG/DL (ref 74–99)
HCT VFR BLD AUTO: 38.8 % (ref 36–46)
HDLC SERPL-MCNC: 71.6 MG/DL
HGB BLD-MCNC: 12.5 G/DL (ref 12–16)
IMM GRANULOCYTES # BLD AUTO: 0.02 X10*3/UL (ref 0–0.5)
IMM GRANULOCYTES NFR BLD AUTO: 0.2 % (ref 0–0.9)
LDLC SERPL CALC-MCNC: 95 MG/DL
LYMPHOCYTES # BLD AUTO: 1.77 X10*3/UL (ref 0.8–3)
LYMPHOCYTES NFR BLD AUTO: 21.2 %
MCH RBC QN AUTO: 30 PG (ref 26–34)
MCHC RBC AUTO-ENTMCNC: 32.2 G/DL (ref 32–36)
MCV RBC AUTO: 93 FL (ref 80–100)
MONOCYTES # BLD AUTO: 0.83 X10*3/UL (ref 0.05–0.8)
MONOCYTES NFR BLD AUTO: 10 %
NEUTROPHILS # BLD AUTO: 5.22 X10*3/UL (ref 1.6–5.5)
NEUTROPHILS NFR BLD AUTO: 62.8 %
NON HDL CHOLESTEROL: 118 MG/DL (ref 0–149)
NRBC BLD-RTO: 0 /100 WBCS (ref 0–0)
PLATELET # BLD AUTO: 288 X10*3/UL (ref 150–450)
POTASSIUM SERPL-SCNC: 4.3 MMOL/L (ref 3.5–5.3)
PROT SERPL-MCNC: 6.4 G/DL (ref 6.4–8.2)
RBC # BLD AUTO: 4.17 X10*6/UL (ref 4–5.2)
SODIUM SERPL-SCNC: 143 MMOL/L (ref 136–145)
TRIGL SERPL-MCNC: 117 MG/DL (ref 0–149)
TSH SERPL-ACNC: 2.51 MIU/L (ref 0.44–3.98)
VLDL: 23 MG/DL (ref 0–40)
WBC # BLD AUTO: 8.3 X10*3/UL (ref 4.4–11.3)

## 2024-08-27 PROCEDURE — 36415 COLL VENOUS BLD VENIPUNCTURE: CPT

## 2024-08-27 PROCEDURE — 80053 COMPREHEN METABOLIC PANEL: CPT

## 2024-08-27 PROCEDURE — 85025 COMPLETE CBC W/AUTO DIFF WBC: CPT

## 2024-08-27 PROCEDURE — 80061 LIPID PANEL: CPT

## 2024-08-27 PROCEDURE — 84443 ASSAY THYROID STIM HORMONE: CPT

## 2024-08-30 ENCOUNTER — APPOINTMENT (OUTPATIENT)
Dept: PRIMARY CARE | Facility: CLINIC | Age: 89
End: 2024-08-30
Payer: MEDICARE

## 2024-08-30 VITALS
DIASTOLIC BLOOD PRESSURE: 64 MMHG | SYSTOLIC BLOOD PRESSURE: 118 MMHG | BODY MASS INDEX: 21.99 KG/M2 | TEMPERATURE: 97.5 F | HEIGHT: 60 IN | WEIGHT: 112 LBS | OXYGEN SATURATION: 97 % | HEART RATE: 91 BPM

## 2024-08-30 DIAGNOSIS — I10 PRIMARY HYPERTENSION: ICD-10-CM

## 2024-08-30 DIAGNOSIS — Z12.31 BREAST CANCER SCREENING BY MAMMOGRAM: ICD-10-CM

## 2024-08-30 DIAGNOSIS — Z00.00 HEALTH MAINTENANCE EXAMINATION: Primary | ICD-10-CM

## 2024-08-30 DIAGNOSIS — E78.2 MIXED HYPERLIPIDEMIA: ICD-10-CM

## 2024-08-30 ASSESSMENT — PATIENT HEALTH QUESTIONNAIRE - PHQ9
SUM OF ALL RESPONSES TO PHQ9 QUESTIONS 1 AND 2: 0
1. LITTLE INTEREST OR PLEASURE IN DOING THINGS: NOT AT ALL
2. FEELING DOWN, DEPRESSED OR HOPELESS: NOT AT ALL

## 2024-08-30 NOTE — PROGRESS NOTES
"Subjective   Patient ID: Rizwana Barton is a 89 y.o. female who presents for Medicare Annual Wellness Visit Subsequent (EP. Here for AMCR. Discuss blood pressure medication and hemrroids).  HPI  Feels well.    Blood pressures are ok- a very few are on the lower side (like low 100s) but ave seem to be around 140; if low she will not take her 2 bp meds     She c/o hemorrhoids \"that come out\" and cause issues with having a bowel movement     She reports she does not need any med refills today     Colonoscopy 12/2017  Review of Systems  General: no fever or night sweats, no change in weight  Eyes: no vision disturbance  ENT: no hearing loss, no hoarseness, no mouth lesions, no sore throat, and no dysphagia  CV: no chest pain, no palpitations, no lower extremity edema  Resp: no shortness of breath, no cough  GI: no abdominal pain, no change in bowel habits  : no urinary problems  MSK: no arthralgias, myalgias, or back pain  Skin; no rashes or new/changed skin lesions  Neuro: no headache, no difficulty walking  Objective   /64   Pulse 91   Temp 36.4 °C (97.5 °F) (Oral)   Ht 1.524 m (5')   Wt 50.8 kg (112 lb)   SpO2 97%   BMI 21.87 kg/m²    Physical Exam  Appears well.     HEENT: OP clear. Sclera white. PERRL. EACs and TMs clear.     Neck: supple, no masses, or lymphadenopathy. No thyromegaly.     CVS: RRR. No murmurs. No peripheral edema.    Lungs: clear with normal inspirations and expirations.    Breasts: Symmetrical, no masses, no skin retraction or dimpling. No nipple discharge. No axillary nodes.    Abd: soft, NT, no masses or HSM.    Rectal: no obvious hemorrhoids    Skin: No suspicious lesions.    Assessment/Plan   Diagnoses and all orders for this visit:  Health maintenance examination  Breast cancer screening by mammogram  -     BI mammo bilateral screening tomosynthesis; Future  Primary hypertension  Mixed hyperlipidemia      Referral to general surgeon for eval of hemorrhoids    Referral to " podiatrist for nick Georges MD  Family Medicine   [facility]

## 2024-09-06 ENCOUNTER — HOSPITAL ENCOUNTER (OUTPATIENT)
Dept: RADIOLOGY | Facility: CLINIC | Age: 89
Discharge: HOME | End: 2024-09-06
Payer: MEDICARE

## 2024-09-06 VITALS — BODY MASS INDEX: 21.99 KG/M2 | WEIGHT: 111.99 LBS | HEIGHT: 60 IN

## 2024-09-06 DIAGNOSIS — Z12.31 BREAST CANCER SCREENING BY MAMMOGRAM: ICD-10-CM

## 2024-09-06 PROCEDURE — 77067 SCR MAMMO BI INCL CAD: CPT

## 2024-09-25 ENCOUNTER — APPOINTMENT (OUTPATIENT)
Dept: PODIATRY | Facility: CLINIC | Age: 89
End: 2024-09-25
Payer: MEDICARE

## 2024-09-25 DIAGNOSIS — M20.11 HAV (HALLUX ABDUCTO VALGUS), RIGHT: Primary | ICD-10-CM

## 2024-09-25 DIAGNOSIS — L84 CORNS AND CALLOSITIES: ICD-10-CM

## 2024-09-25 DIAGNOSIS — M79.671 RIGHT FOOT PAIN: ICD-10-CM

## 2024-09-25 PROCEDURE — 1157F ADVNC CARE PLAN IN RCRD: CPT | Performed by: PODIATRIST

## 2024-09-25 PROCEDURE — 1159F MED LIST DOCD IN RCRD: CPT | Performed by: PODIATRIST

## 2024-09-25 PROCEDURE — 99202 OFFICE O/P NEW SF 15 MIN: CPT | Performed by: PODIATRIST

## 2024-09-25 PROCEDURE — 11055 PARING/CUTG B9 HYPRKER LES 1: CPT | Performed by: PODIATRIST

## 2024-09-25 PROCEDURE — 1123F ACP DISCUSS/DSCN MKR DOCD: CPT | Performed by: PODIATRIST

## 2024-09-25 PROCEDURE — 1036F TOBACCO NON-USER: CPT | Performed by: PODIATRIST

## 2024-09-25 RX ORDER — UBIDECARENONE 75 MG
1 CAPSULE ORAL
COMMUNITY

## 2024-09-25 RX ORDER — ASCORBIC ACID 500 MG
500 TABLET ORAL
COMMUNITY

## 2024-09-25 NOTE — PROGRESS NOTES
"CC: callus and bunion    HPI:  Patient seen as new pt with daughter for callus right 2nd toe and severe bunion, using a gel toe spacer.    PCP: Dr. Georges  Last visit: 24     PMH  Past Medical History:   Diagnosis Date    Acute vaginitis 10/21/2014    Bacterial vaginosis    Other conditions influencing health status     Osteoarthritis    Personal history of other diseases of the circulatory system     History of hypertension    Personal history of other diseases of the digestive system     History of hemorrhoids    Personal history of other diseases of the musculoskeletal system and connective tissue     History of low back pain    Personal history of other diseases of the nervous system and sense organs     History of deafness    Personal history of other mental and behavioral disorders     History of depression     MEDS    Current Outpatient Medications:     amLODIPine (Norvasc) 10 mg tablet, Take 1 tablet (10 mg) by mouth once daily., Disp: 90 tablet, Rfl: 1    ascorbic acid (Vitamin C) 500 mg tablet, Take 1 tablet (500 mg) by mouth once daily., Disp: , Rfl:     atorvastatin (Lipitor) 20 mg tablet, Take 1 tablet (20 mg) by mouth once daily., Disp: 90 tablet, Rfl: 1    cyanocobalamin (Vitamin B-12) 500 mcg tablet, Take 1 tablet (500 mcg) by mouth once daily., Disp: , Rfl:     dupilumab (Dupixent) 300 mg/2 mL syringe injection, Inject under the skin., Disp: , Rfl:     losartan (Cozaar) 100 mg tablet, Take 1 tablet (100 mg) by mouth once daily., Disp: 90 tablet, Rfl: 1    acetaminophen (Tylenol) 325 mg tablet, Take 2 tablets (650 mg) by mouth every 6 hours if needed., Disp: , Rfl:     celecoxib (CeleBREX) 200 mg capsule, Take 1 capsule (200 mg) by mouth once daily. (Patient not taking: Reported on 2024), Disp: 90 capsule, Rfl: 0  Allergies  Allergies   Allergen Reactions    Penicillins Anaphylaxis and Hives     Pt states \"I almost \".    Egg GI Upset     Eggs and chicken cause pt. GI upset     Social " History     Socioeconomic History    Marital status:    Tobacco Use    Smoking status: Never     Passive exposure: Never    Smokeless tobacco: Never   Vaping Use    Vaping status: Never Used   Substance and Sexual Activity    Alcohol use: Never    Drug use: Never    Sexual activity: Never     No family history on file.  Past Surgical History:   Procedure Laterality Date    HYSTERECTOMY  12/13/2012    Hysterectomy       REVIEW OF SYSTEMS    + as noted above in HPI.       Physical examination:   On General Observation: Patient is a pleasant, cooperative, well developed 89 y.o.  adult female. The patient is alert and oriented to time, place and person. Patient has normal affect and mood.  There were no vitals taken for this visit.    Vascular:  DP and PT pulses are 2/4 b/l.  mild edema noted. moderate varicosities b/l.  CFT  6 seconds to all digits bilateral.  Skin temperature is warm to warm from proximal to distal bilateral.      Muscular: Strength is 5/5 b/l.  Motor strength is normal and symmetric proximally, as well as distally, in all four extremities. Good mobility of all extremities.  Tenderness to right 2nd toe.     Neuro:  Proprioception present.   Sensation to vibration is  present. Protective sensation intact  at all pedal sites via [place] [employer] 5.07 monofilament bilateral.  Light touch present bilateral.     Derm:  callus tissue is present right 2nd toe medial aspect    Ortho:  Severe non reducible bunion is present right foot, contraction of the 2nd toe is present Decreased rom of the 1st mpj, mtj, stj, aj          ASSESSMENT:    HAV right   Callus right foot  Pain right foot     PLAN:   Consult  A comprehensive history and physical examination were preformed. The patient was educated on clinical findings, diagnosis and treatment plans. Patient understands all that has been explained and all questions were answered to apparent satisfaction.   -Severe bunion and hammertoe is present right foot,  callus is secondary to this, debrided the callus with 15 blade, recommend to continue the spacer and possible bunion splint, follow up as needed.        Avery Hines DPM

## 2024-10-29 DIAGNOSIS — M54.50 ACUTE RIGHT-SIDED LOW BACK PAIN WITHOUT SCIATICA: ICD-10-CM

## 2024-10-29 RX ORDER — CELECOXIB 200 MG/1
200 CAPSULE ORAL DAILY
Qty: 90 CAPSULE | Refills: 0 | Status: SHIPPED | OUTPATIENT
Start: 2024-10-29 | End: 2025-04-27

## 2025-01-22 DIAGNOSIS — I10 HYPERTENSION, UNSPECIFIED TYPE: ICD-10-CM

## 2025-01-22 RX ORDER — AMLODIPINE BESYLATE 10 MG/1
10 TABLET ORAL DAILY
Qty: 90 TABLET | Refills: 0 | Status: CANCELLED | OUTPATIENT
Start: 2025-01-22 | End: 2025-07-21

## 2025-01-22 NOTE — TELEPHONE ENCOUNTER
Spoke w/daughter, needs refill on  Amlodipine 10 mg - 13 pills left  Send to [facility]  Last appt. 8/30/24  Upcoming appt. 2/28/25  Last BW 8/27/24

## 2025-01-24 RX ORDER — AMLODIPINE BESYLATE 10 MG/1
10 TABLET ORAL DAILY
Qty: 90 TABLET | Refills: 1 | Status: SHIPPED | OUTPATIENT
Start: 2025-01-24 | End: 2025-07-23

## 2025-02-04 DIAGNOSIS — M54.50 ACUTE RIGHT-SIDED LOW BACK PAIN WITHOUT SCIATICA: ICD-10-CM

## 2025-02-04 RX ORDER — CELECOXIB 200 MG/1
200 CAPSULE ORAL DAILY
Qty: 90 CAPSULE | Refills: 0 | Status: SHIPPED | OUTPATIENT
Start: 2025-02-04 | End: 2025-08-03

## 2025-02-04 NOTE — TELEPHONE ENCOUNTER
Daughter called ldm requesting Rx refill on   Celebrex 200 mg   Amlodipine 10 mg   [facility]     Last seen 08/31/24  Scheduled 02/28/25  Last B/w 08/27/24

## 2025-02-04 NOTE — TELEPHONE ENCOUNTER
Last appt-8/31/24  Next appt-2/28/25  Last BW-8/27/24       Amlodipine was filled on 1/24/25 and sent to [facility] for 90 days with 1 RF.

## 2025-02-05 NOTE — TELEPHONE ENCOUNTER
Let dtr know that amlodipine was sent in 1/24/25 so they should have received it already; I sent in refill of the Celebrex

## 2025-02-11 DIAGNOSIS — N18.32 STAGE 3B CHRONIC KIDNEY DISEASE (MULTI): ICD-10-CM

## 2025-02-11 DIAGNOSIS — I10 PRIMARY HYPERTENSION: ICD-10-CM

## 2025-02-11 DIAGNOSIS — E78.2 MIXED HYPERLIPIDEMIA: ICD-10-CM

## 2025-02-24 LAB
ALBUMIN SERPL-MCNC: 4.2 G/DL (ref 3.6–5.1)
ALP SERPL-CCNC: 80 U/L (ref 37–153)
ALT SERPL-CCNC: 10 U/L (ref 6–29)
ANION GAP SERPL CALCULATED.4IONS-SCNC: 12 MMOL/L (CALC) (ref 7–17)
AST SERPL-CCNC: 15 U/L (ref 10–35)
BILIRUB SERPL-MCNC: 0.7 MG/DL (ref 0.2–1.2)
BUN SERPL-MCNC: 17 MG/DL (ref 7–25)
CALCIUM SERPL-MCNC: 9.5 MG/DL (ref 8.6–10.4)
CHLORIDE SERPL-SCNC: 107 MMOL/L (ref 98–110)
CHOLEST SERPL-MCNC: 196 MG/DL
CHOLEST/HDLC SERPL: 2.5 (CALC)
CO2 SERPL-SCNC: 24 MMOL/L (ref 20–32)
CREAT SERPL-MCNC: 1.01 MG/DL (ref 0.6–0.95)
EGFRCR SERPLBLD CKD-EPI 2021: 52 ML/MIN/1.73M2
GLUCOSE SERPL-MCNC: 100 MG/DL (ref 65–99)
HDLC SERPL-MCNC: 77 MG/DL
LDLC SERPL CALC-MCNC: 95 MG/DL (CALC)
NONHDLC SERPL-MCNC: 119 MG/DL (CALC)
POTASSIUM SERPL-SCNC: 4.6 MMOL/L (ref 3.5–5.3)
PROT SERPL-MCNC: 6.5 G/DL (ref 6.1–8.1)
SODIUM SERPL-SCNC: 143 MMOL/L (ref 135–146)
TRIGL SERPL-MCNC: 144 MG/DL

## 2025-02-28 ENCOUNTER — APPOINTMENT (OUTPATIENT)
Dept: PRIMARY CARE | Facility: CLINIC | Age: OVER 89
End: 2025-02-28
Payer: MEDICARE

## 2025-02-28 VITALS
DIASTOLIC BLOOD PRESSURE: 62 MMHG | SYSTOLIC BLOOD PRESSURE: 152 MMHG | RESPIRATION RATE: 18 BRPM | HEIGHT: 60 IN | HEART RATE: 84 BPM | OXYGEN SATURATION: 99 % | TEMPERATURE: 97.5 F | WEIGHT: 115.1 LBS | BODY MASS INDEX: 22.6 KG/M2

## 2025-02-28 DIAGNOSIS — E78.5 HYPERLIPIDEMIA, UNSPECIFIED HYPERLIPIDEMIA TYPE: ICD-10-CM

## 2025-02-28 DIAGNOSIS — M54.50 ACUTE RIGHT-SIDED LOW BACK PAIN WITHOUT SCIATICA: ICD-10-CM

## 2025-02-28 DIAGNOSIS — I10 HYPERTENSION, UNSPECIFIED TYPE: ICD-10-CM

## 2025-02-28 PROCEDURE — 1123F ACP DISCUSS/DSCN MKR DOCD: CPT | Performed by: FAMILY MEDICINE

## 2025-02-28 PROCEDURE — 1158F ADVNC CARE PLAN TLK DOCD: CPT | Performed by: FAMILY MEDICINE

## 2025-02-28 PROCEDURE — 1159F MED LIST DOCD IN RCRD: CPT | Performed by: FAMILY MEDICINE

## 2025-02-28 PROCEDURE — 3078F DIAST BP <80 MM HG: CPT | Performed by: FAMILY MEDICINE

## 2025-02-28 PROCEDURE — 3077F SYST BP >= 140 MM HG: CPT | Performed by: FAMILY MEDICINE

## 2025-02-28 PROCEDURE — 1157F ADVNC CARE PLAN IN RCRD: CPT | Performed by: FAMILY MEDICINE

## 2025-02-28 PROCEDURE — 99214 OFFICE O/P EST MOD 30 MIN: CPT | Performed by: FAMILY MEDICINE

## 2025-02-28 PROCEDURE — G2211 COMPLEX E/M VISIT ADD ON: HCPCS | Performed by: FAMILY MEDICINE

## 2025-02-28 RX ORDER — ATORVASTATIN CALCIUM 20 MG/1
20 TABLET, FILM COATED ORAL DAILY
Qty: 90 TABLET | Refills: 1 | Status: SHIPPED | OUTPATIENT
Start: 2025-02-28

## 2025-02-28 RX ORDER — LOSARTAN POTASSIUM 100 MG/1
100 TABLET ORAL DAILY
Qty: 90 TABLET | Refills: 1 | Status: SHIPPED | OUTPATIENT
Start: 2025-02-28

## 2025-02-28 RX ORDER — AMLODIPINE BESYLATE 10 MG/1
10 TABLET ORAL DAILY
Qty: 90 TABLET | Refills: 1 | Status: SHIPPED | OUTPATIENT
Start: 2025-02-28 | End: 2025-08-27

## 2025-02-28 RX ORDER — CELECOXIB 200 MG/1
200 CAPSULE ORAL DAILY
Qty: 90 CAPSULE | Refills: 1 | Status: SHIPPED | OUTPATIENT
Start: 2025-02-28 | End: 2025-08-27

## 2025-02-28 NOTE — PROGRESS NOTES
Subjective   Patient ID: Rizwana Barton is a 89 y.o. female who presents for Follow-up (Htn, BW results, ankle swelling).  HPI  Feels well, no specific complaints or concerns today.    Blood pressures fair control/her typical     Had banding of hemorrhoids by colorectal surgeon, Dr. Tim Gamino  Review of Systems  Genl: The patient has been in good health without recent weight change, fevers, or night sweats  CVS: No chest pain, irregular heartbeat, or shortness of breath.  Resp: No cough or difficulty breathing  GI: No change in bowel habits or abdominal pain. No heartburn  : No urinary problems.   Objective   /62   Pulse 84   Temp 36.4 °C (97.5 °F)   Resp 18   Ht 1.524 m (5')   Wt 52.2 kg (115 lb 1.6 oz)   SpO2 99%   BMI 22.48 kg/m²    Physical Exam  Alert, well-appearing.    HEENT: OP clear. Sclera white. Pupils equal and round. EACs and TMs clear.    Neck: Supple. No palpable masses. Thyroid was not enlarged.    CVS: RRR, no murmurs. No peripheral edema.    Respiratory: Clear and equal breath sounds.    GI: Soft, nontender, no masses or hepatosplenomegaly.     Reviewed recent labs  Assessment/Plan   Diagnoses and all orders for this visit:  Hypertension, unspecified type  -     losartan (Cozaar) 100 mg tablet; Take 1 tablet (100 mg) by mouth once daily.  -     amLODIPine (Norvasc) 10 mg tablet; Take 1 tablet (10 mg) by mouth once daily.  Acute right-sided low back pain without sciatica  -     celecoxib (CeleBREX) 200 mg capsule; Take 1 capsule (200 mg) by mouth once daily.  Hyperlipidemia, unspecified hyperlipidemia type  -     atorvastatin (Lipitor) 20 mg tablet; Take 1 tablet (20 mg) by mouth once daily.      Mercedez Georges MD  Family Medicine   [facility]

## 2025-03-25 ENCOUNTER — OFFICE VISIT (OUTPATIENT)
Dept: PRIMARY CARE | Facility: CLINIC | Age: OVER 89
End: 2025-03-25
Payer: MEDICARE

## 2025-03-25 VITALS
OXYGEN SATURATION: 96 % | BODY MASS INDEX: 22.62 KG/M2 | HEIGHT: 60 IN | TEMPERATURE: 97.9 F | WEIGHT: 115.2 LBS | HEART RATE: 98 BPM | DIASTOLIC BLOOD PRESSURE: 62 MMHG | SYSTOLIC BLOOD PRESSURE: 134 MMHG | RESPIRATION RATE: 18 BRPM

## 2025-03-25 DIAGNOSIS — R35.0 URINE FREQUENCY: ICD-10-CM

## 2025-03-25 LAB
POC APPEARANCE, URINE: CLEAR
POC BILIRUBIN, URINE: NEGATIVE
POC BLOOD, URINE: ABNORMAL
POC COLOR, URINE: YELLOW
POC GLUCOSE, URINE: NEGATIVE MG/DL
POC KETONES, URINE: NEGATIVE MG/DL
POC LEUKOCYTES, URINE: ABNORMAL
POC NITRITE,URINE: NEGATIVE
POC PH, URINE: 7 PH
POC PROTEIN, URINE: NEGATIVE MG/DL
POC SPECIFIC GRAVITY, URINE: 1.01
POC UROBILINOGEN, URINE: 0.2 EU/DL

## 2025-03-25 PROCEDURE — 3075F SYST BP GE 130 - 139MM HG: CPT | Performed by: FAMILY MEDICINE

## 2025-03-25 PROCEDURE — 3078F DIAST BP <80 MM HG: CPT | Performed by: FAMILY MEDICINE

## 2025-03-25 PROCEDURE — 81003 URINALYSIS AUTO W/O SCOPE: CPT | Performed by: FAMILY MEDICINE

## 2025-03-25 PROCEDURE — 1158F ADVNC CARE PLAN TLK DOCD: CPT | Performed by: FAMILY MEDICINE

## 2025-03-25 PROCEDURE — 1159F MED LIST DOCD IN RCRD: CPT | Performed by: FAMILY MEDICINE

## 2025-03-25 PROCEDURE — 99213 OFFICE O/P EST LOW 20 MIN: CPT | Performed by: FAMILY MEDICINE

## 2025-03-25 PROCEDURE — 1123F ACP DISCUSS/DSCN MKR DOCD: CPT | Performed by: FAMILY MEDICINE

## 2025-03-25 PROCEDURE — 1036F TOBACCO NON-USER: CPT | Performed by: FAMILY MEDICINE

## 2025-03-25 PROCEDURE — 1157F ADVNC CARE PLAN IN RCRD: CPT | Performed by: FAMILY MEDICINE

## 2025-03-25 ASSESSMENT — PATIENT HEALTH QUESTIONNAIRE - PHQ9
2. FEELING DOWN, DEPRESSED OR HOPELESS: NOT AT ALL
SUM OF ALL RESPONSES TO PHQ9 QUESTIONS 1 AND 2: 0
1. LITTLE INTEREST OR PLEASURE IN DOING THINGS: NOT AT ALL

## 2025-03-25 NOTE — PROGRESS NOTES
Subjective   Patient ID: Rizwana Barton is a 89 y.o. female who presents for urine frequency (Urinary urgency x 2-3 days ,had surgery 1 week ago).  HPI  Has urinary urge- that is the only symptom  No burning or abdominal pain  Does not feel sick- no fever, no back pain     Had surgery last week for rectal prolapse   Objective   /62   Pulse 98   Temp 36.6 °C (97.9 °F)   Resp 18   Ht 1.524 m (5')   Wt 52.3 kg (115 lb 3.2 oz)   SpO2 96%   BMI 22.50 kg/m²    Physical Exam  Alert, well-appearing.    GI: Soft, nontender.     Back: no CVA tenderness   Assessment/Plan   Diagnoses and all orders for this visit:  Urine frequency  -     POCT UA Automated manually resulted  -     Urine Culture    Will await culture results     Mercedez Georges MD  Family Medicine   [facility]

## 2025-03-27 LAB — BACTERIA UR CULT: NORMAL

## 2025-08-13 DIAGNOSIS — I10 PRIMARY HYPERTENSION: ICD-10-CM

## 2025-08-13 DIAGNOSIS — Z13.29 SCREENING FOR THYROID DISORDER: ICD-10-CM

## 2025-08-13 DIAGNOSIS — Z00.00 HEALTH MAINTENANCE EXAMINATION: ICD-10-CM

## 2025-08-13 DIAGNOSIS — N18.32 STAGE 3B CHRONIC KIDNEY DISEASE (MULTI): ICD-10-CM

## 2025-08-13 DIAGNOSIS — E78.2 MIXED HYPERLIPIDEMIA: ICD-10-CM

## 2025-08-22 ENCOUNTER — HOSPITAL ENCOUNTER (INPATIENT)
Facility: HOSPITAL | Age: OVER 89
Discharge: HOME | DRG: 064 | End: 2025-08-22
Attending: INTERNAL MEDICINE | Admitting: INTERNAL MEDICINE
Payer: MEDICARE

## 2025-08-22 DIAGNOSIS — I63.89 OTHER CEREBRAL INFARCTION: ICD-10-CM

## 2025-08-22 DIAGNOSIS — I69.398 VERTIGO FOLLOWING CVA (CEREBROVASCULAR ACCIDENT): ICD-10-CM

## 2025-08-22 DIAGNOSIS — I63.89 AC ISCH MULTI VASC TERRITORIES STROKE (MULTI): ICD-10-CM

## 2025-08-22 DIAGNOSIS — I63.49 CEREBROVASCULAR ACCIDENT (CVA) DUE TO EMBOLISM OF OTHER CEREBRAL ARTERY: ICD-10-CM

## 2025-08-22 DIAGNOSIS — I63.531: ICD-10-CM

## 2025-08-22 DIAGNOSIS — R42 VERTIGO FOLLOWING CVA (CEREBROVASCULAR ACCIDENT): ICD-10-CM

## 2025-08-22 DIAGNOSIS — I63.9 STROKE OF UNKNOWN ETIOLOGY (MULTI): Primary | ICD-10-CM

## 2025-08-22 DIAGNOSIS — Z86.73 STATUS POST CVA: ICD-10-CM

## 2025-08-22 LAB
ALBUMIN SERPL BCP-MCNC: 4.2 G/DL (ref 3.4–5)
ALBUMIN SERPL-MCNC: 4.5 G/DL (ref 3.6–5.1)
ALP SERPL-CCNC: 79 U/L (ref 33–136)
ALP SERPL-CCNC: 80 U/L (ref 37–153)
ALT SERPL W P-5'-P-CCNC: 14 U/L (ref 7–45)
ALT SERPL-CCNC: 14 U/L (ref 6–29)
ANION GAP SERPL CALC-SCNC: 13 MMOL/L (ref 10–20)
ANION GAP SERPL CALCULATED.4IONS-SCNC: 9 MMOL/L (CALC) (ref 7–17)
AST SERPL W P-5'-P-CCNC: 18 U/L (ref 9–39)
AST SERPL-CCNC: 20 U/L (ref 10–35)
BASOPHILS # BLD AUTO: 64 CELLS/UL (ref 0–200)
BASOPHILS NFR BLD AUTO: 0.7 %
BILIRUB SERPL-MCNC: 0.9 MG/DL (ref 0.2–1.2)
BILIRUB SERPL-MCNC: 0.9 MG/DL (ref 0–1.2)
BUN SERPL-MCNC: 13 MG/DL (ref 6–23)
BUN SERPL-MCNC: 15 MG/DL (ref 7–25)
CALCIUM SERPL-MCNC: 9.5 MG/DL (ref 8.6–10.4)
CALCIUM SERPL-MCNC: 9.6 MG/DL (ref 8.6–10.3)
CHLORIDE SERPL-SCNC: 103 MMOL/L (ref 98–107)
CHLORIDE SERPL-SCNC: 106 MMOL/L (ref 98–110)
CHOLEST SERPL-MCNC: 176 MG/DL
CHOLEST/HDLC SERPL: 2.2 (CALC)
CO2 SERPL-SCNC: 27 MMOL/L (ref 20–32)
CO2 SERPL-SCNC: 27 MMOL/L (ref 21–32)
CREAT SERPL-MCNC: 0.95 MG/DL (ref 0.6–0.95)
CREAT SERPL-MCNC: 0.96 MG/DL (ref 0.5–1.05)
EGFRCR SERPLBLD CKD-EPI 2021: 55 ML/MIN/1.73M*2
EGFRCR SERPLBLD CKD-EPI 2021: 56 ML/MIN/1.73M2
EOSINOPHIL # BLD AUTO: 184 CELLS/UL (ref 15–500)
EOSINOPHIL NFR BLD AUTO: 2 %
ERYTHROCYTE [DISTWIDTH] IN BLOOD BY AUTOMATED COUNT: 14 % (ref 11–15)
ERYTHROCYTE [DISTWIDTH] IN BLOOD BY AUTOMATED COUNT: 14.1 % (ref 11.5–14.5)
GLUCOSE BLD MANUAL STRIP-MCNC: 104 MG/DL (ref 74–99)
GLUCOSE SERPL-MCNC: 103 MG/DL (ref 74–99)
GLUCOSE SERPL-MCNC: 107 MG/DL (ref 65–99)
HCT VFR BLD AUTO: 41.8 % (ref 36–46)
HCT VFR BLD AUTO: 43.5 % (ref 35–45)
HDLC SERPL-MCNC: 79 MG/DL
HGB BLD-MCNC: 13.2 G/DL (ref 12–16)
HGB BLD-MCNC: 13.9 G/DL (ref 11.7–15.5)
LDLC SERPL CALC-MCNC: 76 MG/DL (CALC)
LYMPHOCYTES # BLD AUTO: 1316 CELLS/UL (ref 850–3900)
LYMPHOCYTES NFR BLD AUTO: 14.3 %
MCH RBC QN AUTO: 29.9 PG (ref 26–34)
MCH RBC QN AUTO: 30.6 PG (ref 27–33)
MCHC RBC AUTO-ENTMCNC: 31.6 G/DL (ref 32–36)
MCHC RBC AUTO-ENTMCNC: 32 G/DL (ref 32–36)
MCV RBC AUTO: 95 FL (ref 80–100)
MCV RBC AUTO: 95.8 FL (ref 80–100)
MONOCYTES # BLD AUTO: 662 CELLS/UL (ref 200–950)
MONOCYTES NFR BLD AUTO: 7.2 %
NEUTROPHILS # BLD AUTO: 6974 CELLS/UL (ref 1500–7800)
NEUTROPHILS NFR BLD AUTO: 75.8 %
NONHDLC SERPL-MCNC: 97 MG/DL (CALC)
NRBC BLD-RTO: 0 /100 WBCS (ref 0–0)
PLATELET # BLD AUTO: 252 X10*3/UL (ref 150–450)
PLATELET # BLD AUTO: 259 THOUSAND/UL (ref 140–400)
PMV BLD REES-ECKER: 11.5 FL (ref 7.5–12.5)
POTASSIUM SERPL-SCNC: 3.9 MMOL/L (ref 3.5–5.3)
POTASSIUM SERPL-SCNC: 4.2 MMOL/L (ref 3.5–5.3)
PROT SERPL-MCNC: 6.6 G/DL (ref 6.1–8.1)
PROT SERPL-MCNC: 6.6 G/DL (ref 6.4–8.2)
RBC # BLD AUTO: 4.42 X10*6/UL (ref 4–5.2)
RBC # BLD AUTO: 4.54 MILLION/UL (ref 3.8–5.1)
SODIUM SERPL-SCNC: 139 MMOL/L (ref 136–145)
SODIUM SERPL-SCNC: 142 MMOL/L (ref 135–146)
TRIGL SERPL-MCNC: 127 MG/DL
TSH SERPL-ACNC: 3.3 MIU/L (ref 0.4–4.5)
WBC # BLD AUTO: 11.3 X10*3/UL (ref 4.4–11.3)
WBC # BLD AUTO: 9.2 THOUSAND/UL (ref 3.8–10.8)

## 2025-08-22 PROCEDURE — 82947 ASSAY GLUCOSE BLOOD QUANT: CPT

## 2025-08-22 PROCEDURE — 1200000002 HC GENERAL ROOM WITH TELEMETRY DAILY

## 2025-08-22 PROCEDURE — 2500000004 HC RX 250 GENERAL PHARMACY W/ HCPCS (ALT 636 FOR OP/ED): Performed by: INTERNAL MEDICINE

## 2025-08-22 PROCEDURE — 2500000005 HC RX 250 GENERAL PHARMACY W/O HCPCS: Performed by: INTERNAL MEDICINE

## 2025-08-22 PROCEDURE — 83036 HEMOGLOBIN GLYCOSYLATED A1C: CPT | Mod: PARLAB | Performed by: INTERNAL MEDICINE

## 2025-08-22 PROCEDURE — 2500000001 HC RX 250 WO HCPCS SELF ADMINISTERED DRUGS (ALT 637 FOR MEDICARE OP): Performed by: INTERNAL MEDICINE

## 2025-08-22 PROCEDURE — 36415 COLL VENOUS BLD VENIPUNCTURE: CPT | Performed by: INTERNAL MEDICINE

## 2025-08-22 PROCEDURE — 85027 COMPLETE CBC AUTOMATED: CPT | Performed by: INTERNAL MEDICINE

## 2025-08-22 PROCEDURE — 99222 1ST HOSP IP/OBS MODERATE 55: CPT | Performed by: INTERNAL MEDICINE

## 2025-08-22 PROCEDURE — 80053 COMPREHEN METABOLIC PANEL: CPT | Performed by: INTERNAL MEDICINE

## 2025-08-22 RX ORDER — LABETALOL HYDROCHLORIDE 5 MG/ML
10 INJECTION, SOLUTION INTRAVENOUS EVERY 10 MIN PRN
Status: DISCONTINUED | OUTPATIENT
Start: 2025-08-22 | End: 2025-08-22

## 2025-08-22 RX ORDER — ASCORBIC ACID 250 MG
500 TABLET ORAL
Status: DISCONTINUED | OUTPATIENT
Start: 2025-08-23 | End: 2025-08-27 | Stop reason: HOSPADM

## 2025-08-22 RX ORDER — TALC
3 POWDER (GRAM) TOPICAL NIGHTLY
Status: DISCONTINUED | OUTPATIENT
Start: 2025-08-22 | End: 2025-08-27 | Stop reason: HOSPADM

## 2025-08-22 RX ORDER — HEPARIN SODIUM 5000 [USP'U]/ML
5000 INJECTION, SOLUTION INTRAVENOUS; SUBCUTANEOUS EVERY 8 HOURS
Status: DISCONTINUED | OUTPATIENT
Start: 2025-08-22 | End: 2025-08-26 | Stop reason: ALTCHOICE

## 2025-08-22 RX ORDER — LOSARTAN POTASSIUM 50 MG/1
100 TABLET ORAL DAILY
Status: DISCONTINUED | OUTPATIENT
Start: 2025-08-23 | End: 2025-08-27 | Stop reason: HOSPADM

## 2025-08-22 RX ORDER — HYDRALAZINE HYDROCHLORIDE 25 MG/1
25 TABLET, FILM COATED ORAL EVERY 6 HOURS PRN
Status: DISCONTINUED | OUTPATIENT
Start: 2025-08-24 | End: 2025-08-22

## 2025-08-22 RX ORDER — ACETAMINOPHEN 325 MG/1
650 TABLET ORAL EVERY 6 HOURS PRN
Status: DISCONTINUED | OUTPATIENT
Start: 2025-08-22 | End: 2025-08-27 | Stop reason: HOSPADM

## 2025-08-22 RX ORDER — NAPROXEN SODIUM 220 MG/1
81 TABLET, FILM COATED ORAL DAILY
Status: DISCONTINUED | OUTPATIENT
Start: 2025-08-22 | End: 2025-08-22

## 2025-08-22 RX ORDER — POLYETHYLENE GLYCOL 3350 17 G/17G
17 POWDER, FOR SOLUTION ORAL DAILY PRN
Status: DISCONTINUED | OUTPATIENT
Start: 2025-08-22 | End: 2025-08-27 | Stop reason: HOSPADM

## 2025-08-22 RX ORDER — HYDRALAZINE HYDROCHLORIDE 20 MG/ML
10 INJECTION INTRAMUSCULAR; INTRAVENOUS
Status: DISCONTINUED | OUTPATIENT
Start: 2025-08-22 | End: 2025-08-22

## 2025-08-22 RX ORDER — ENOXAPARIN SODIUM 100 MG/ML
40 INJECTION SUBCUTANEOUS EVERY 24 HOURS
Status: DISCONTINUED | OUTPATIENT
Start: 2025-08-22 | End: 2025-08-22

## 2025-08-22 RX ORDER — AMLODIPINE BESYLATE 10 MG/1
10 TABLET ORAL DAILY
Status: DISCONTINUED | OUTPATIENT
Start: 2025-08-23 | End: 2025-08-27 | Stop reason: HOSPADM

## 2025-08-22 RX ORDER — ASPIRIN 300 MG/1
300 SUPPOSITORY RECTAL DAILY
Status: DISCONTINUED | OUTPATIENT
Start: 2025-08-22 | End: 2025-08-22

## 2025-08-22 RX ORDER — ATORVASTATIN CALCIUM 40 MG/1
40 TABLET, FILM COATED ORAL NIGHTLY
Status: DISCONTINUED | OUTPATIENT
Start: 2025-08-22 | End: 2025-08-27 | Stop reason: HOSPADM

## 2025-08-22 RX ORDER — VIT C/E/ZN/COPPR/LUTEIN/ZEAXAN 250MG-90MG
500 CAPSULE ORAL
Status: DISCONTINUED | OUTPATIENT
Start: 2025-08-23 | End: 2025-08-27 | Stop reason: HOSPADM

## 2025-08-22 RX ORDER — TALC
3 POWDER (GRAM) TOPICAL NIGHTLY PRN
Status: DISCONTINUED | OUTPATIENT
Start: 2025-08-22 | End: 2025-08-22

## 2025-08-22 RX ORDER — ASPIRIN 81 MG/1
81 TABLET ORAL DAILY
Status: DISCONTINUED | OUTPATIENT
Start: 2025-08-22 | End: 2025-08-25

## 2025-08-22 RX ORDER — HYDRALAZINE HYDROCHLORIDE 20 MG/ML
10 INJECTION INTRAMUSCULAR; INTRAVENOUS EVERY 8 HOURS PRN
Status: DISCONTINUED | OUTPATIENT
Start: 2025-08-22 | End: 2025-08-27 | Stop reason: HOSPADM

## 2025-08-22 RX ADMIN — ATORVASTATIN CALCIUM 40 MG: 40 TABLET, FILM COATED ORAL at 21:49

## 2025-08-22 RX ADMIN — HYDRALAZINE HYDROCHLORIDE 10 MG: 20 INJECTION INTRAMUSCULAR; INTRAVENOUS at 21:48

## 2025-08-22 RX ADMIN — HEPARIN SODIUM 5000 UNITS: 5000 INJECTION, SOLUTION INTRAVENOUS; SUBCUTANEOUS at 21:48

## 2025-08-22 RX ADMIN — Medication 3 MG: at 21:49

## 2025-08-22 RX ADMIN — ASPIRIN 81 MG: 81 TABLET, COATED ORAL at 21:49

## 2025-08-22 SDOH — ECONOMIC STABILITY: FOOD INSECURITY: WITHIN THE PAST 12 MONTHS, THE FOOD YOU BOUGHT JUST DIDN'T LAST AND YOU DIDN'T HAVE MONEY TO GET MORE.: NEVER TRUE

## 2025-08-22 SDOH — HEALTH STABILITY: MENTAL HEALTH: HOW OFTEN DO YOU HAVE A DRINK CONTAINING ALCOHOL?: NEVER

## 2025-08-22 SDOH — ECONOMIC STABILITY: FOOD INSECURITY: WITHIN THE PAST 12 MONTHS, YOU WORRIED THAT YOUR FOOD WOULD RUN OUT BEFORE YOU GOT THE MONEY TO BUY MORE.: NEVER TRUE

## 2025-08-22 SDOH — SOCIAL STABILITY: SOCIAL INSECURITY: ARE YOU OR HAVE YOU BEEN THREATENED OR ABUSED PHYSICALLY, EMOTIONALLY, OR SEXUALLY BY ANYONE?: NO

## 2025-08-22 SDOH — SOCIAL STABILITY: SOCIAL INSECURITY: ARE THERE ANY APPARENT SIGNS OF INJURIES/BEHAVIORS THAT COULD BE RELATED TO ABUSE/NEGLECT?: NO

## 2025-08-22 SDOH — SOCIAL STABILITY: SOCIAL INSECURITY: DO YOU FEEL UNSAFE GOING BACK TO THE PLACE WHERE YOU ARE LIVING?: NO

## 2025-08-22 SDOH — SOCIAL STABILITY: SOCIAL INSECURITY: ABUSE: ADULT

## 2025-08-22 SDOH — HEALTH STABILITY: MENTAL HEALTH: HOW OFTEN DO YOU HAVE SIX OR MORE DRINKS ON ONE OCCASION?: NEVER

## 2025-08-22 SDOH — ECONOMIC STABILITY: HOUSING INSECURITY: IN THE LAST 12 MONTHS, WAS THERE A TIME WHEN YOU WERE NOT ABLE TO PAY THE MORTGAGE OR RENT ON TIME?: NO

## 2025-08-22 SDOH — SOCIAL STABILITY: SOCIAL INSECURITY
WITHIN THE LAST YEAR, HAVE YOU BEEN KICKED, HIT, SLAPPED, OR OTHERWISE PHYSICALLY HURT BY YOUR PARTNER OR EX-PARTNER?: NO

## 2025-08-22 SDOH — SOCIAL STABILITY: SOCIAL INSECURITY: WITHIN THE LAST YEAR, HAVE YOU BEEN AFRAID OF YOUR PARTNER OR EX-PARTNER?: NO

## 2025-08-22 SDOH — ECONOMIC STABILITY: HOUSING INSECURITY: AT ANY TIME IN THE PAST 12 MONTHS, WERE YOU HOMELESS OR LIVING IN A SHELTER (INCLUDING NOW)?: NO

## 2025-08-22 SDOH — SOCIAL STABILITY: SOCIAL INSECURITY: HAVE YOU HAD ANY THOUGHTS OF HARMING ANYONE ELSE?: NO

## 2025-08-22 SDOH — SOCIAL STABILITY: SOCIAL INSECURITY: WITHIN THE LAST YEAR, HAVE YOU BEEN HUMILIATED OR EMOTIONALLY ABUSED IN OTHER WAYS BY YOUR PARTNER OR EX-PARTNER?: NO

## 2025-08-22 SDOH — HEALTH STABILITY: MENTAL HEALTH: HOW MANY DRINKS CONTAINING ALCOHOL DO YOU HAVE ON A TYPICAL DAY WHEN YOU ARE DRINKING?: PATIENT DOES NOT DRINK

## 2025-08-22 SDOH — SOCIAL STABILITY: SOCIAL INSECURITY: WERE YOU ABLE TO COMPLETE ALL THE BEHAVIORAL HEALTH SCREENINGS?: YES

## 2025-08-22 SDOH — SOCIAL STABILITY: SOCIAL INSECURITY
WITHIN THE LAST YEAR, HAVE YOU BEEN RAPED OR FORCED TO HAVE ANY KIND OF SEXUAL ACTIVITY BY YOUR PARTNER OR EX-PARTNER?: NO

## 2025-08-22 SDOH — SOCIAL STABILITY: SOCIAL INSECURITY: DO YOU FEEL ANYONE HAS EXPLOITED OR TAKEN ADVANTAGE OF YOU FINANCIALLY OR OF YOUR PERSONAL PROPERTY?: NO

## 2025-08-22 SDOH — SOCIAL STABILITY: SOCIAL INSECURITY: HAVE YOU HAD THOUGHTS OF HARMING ANYONE ELSE?: NO

## 2025-08-22 SDOH — ECONOMIC STABILITY: INCOME INSECURITY: IN THE PAST 12 MONTHS HAS THE ELECTRIC, GAS, OIL, OR WATER COMPANY THREATENED TO SHUT OFF SERVICES IN YOUR HOME?: NO

## 2025-08-22 SDOH — ECONOMIC STABILITY: FOOD INSECURITY: HOW HARD IS IT FOR YOU TO PAY FOR THE VERY BASICS LIKE FOOD, HOUSING, MEDICAL CARE, AND HEATING?: NOT HARD AT ALL

## 2025-08-22 SDOH — SOCIAL STABILITY: SOCIAL INSECURITY: HAS ANYONE EVER THREATENED TO HURT YOUR FAMILY OR YOUR PETS?: NO

## 2025-08-22 SDOH — SOCIAL STABILITY: SOCIAL INSECURITY: DOES ANYONE TRY TO KEEP YOU FROM HAVING/CONTACTING OTHER FRIENDS OR DOING THINGS OUTSIDE YOUR HOME?: NO

## 2025-08-22 SDOH — ECONOMIC STABILITY: TRANSPORTATION INSECURITY: IN THE PAST 12 MONTHS, HAS LACK OF TRANSPORTATION KEPT YOU FROM MEDICAL APPOINTMENTS OR FROM GETTING MEDICATIONS?: NO

## 2025-08-22 SDOH — ECONOMIC STABILITY: HOUSING INSECURITY: IN THE PAST 12 MONTHS, HOW MANY TIMES HAVE YOU MOVED WHERE YOU WERE LIVING?: 1

## 2025-08-22 ASSESSMENT — ACTIVITIES OF DAILY LIVING (ADL)
FEEDING YOURSELF: INDEPENDENT
JUDGMENT_ADEQUATE_SAFELY_COMPLETE_DAILY_ACTIVITIES: YES
TOILETING: NEEDS ASSISTANCE
DRESSING YOURSELF: INDEPENDENT
WALKS IN HOME: INDEPENDENT
LACK_OF_TRANSPORTATION: NO
ADEQUATE_TO_COMPLETE_ADL: YES
LACK_OF_TRANSPORTATION: NO
PATIENT'S MEMORY ADEQUATE TO SAFELY COMPLETE DAILY ACTIVITIES?: YES
BATHING: INDEPENDENT
GROOMING: INDEPENDENT
HEARING - LEFT EAR: HEARING AID
HEARING - RIGHT EAR: HEARING AID

## 2025-08-22 ASSESSMENT — LIFESTYLE VARIABLES
AUDIT-C TOTAL SCORE: 0
HOW OFTEN DO YOU HAVE A DRINK CONTAINING ALCOHOL: NEVER
HOW MANY STANDARD DRINKS CONTAINING ALCOHOL DO YOU HAVE ON A TYPICAL DAY: PATIENT DOES NOT DRINK
AUDIT-C TOTAL SCORE: 0
SKIP TO QUESTIONS 9-10: 1
SKIP TO QUESTIONS 9-10: 1
AUDIT-C TOTAL SCORE: 0
HOW OFTEN DO YOU HAVE 6 OR MORE DRINKS ON ONE OCCASION: NEVER

## 2025-08-22 ASSESSMENT — COGNITIVE AND FUNCTIONAL STATUS - GENERAL
DAILY ACTIVITIY SCORE: 23
DRESSING REGULAR LOWER BODY CLOTHING: A LITTLE
MOVING TO AND FROM BED TO CHAIR: A LITTLE
CLIMB 3 TO 5 STEPS WITH RAILING: A LITTLE
WALKING IN HOSPITAL ROOM: A LITTLE
STANDING UP FROM CHAIR USING ARMS: A LITTLE
PATIENT BASELINE BEDBOUND: NO
MOBILITY SCORE: 20

## 2025-08-22 ASSESSMENT — PAIN SCALES - GENERAL: PAINLEVEL_OUTOF10: 0 - NO PAIN

## 2025-08-22 ASSESSMENT — PATIENT HEALTH QUESTIONNAIRE - PHQ9
1. LITTLE INTEREST OR PLEASURE IN DOING THINGS: NOT AT ALL
2. FEELING DOWN, DEPRESSED OR HOPELESS: NOT AT ALL
SUM OF ALL RESPONSES TO PHQ9 QUESTIONS 1 & 2: 0

## 2025-08-22 ASSESSMENT — PAIN - FUNCTIONAL ASSESSMENT: PAIN_FUNCTIONAL_ASSESSMENT: 0-10

## 2025-08-23 ENCOUNTER — APPOINTMENT (OUTPATIENT)
Dept: CARDIOLOGY | Facility: HOSPITAL | Age: OVER 89
DRG: 064 | End: 2025-08-23
Payer: MEDICARE

## 2025-08-23 ENCOUNTER — APPOINTMENT (OUTPATIENT)
Dept: RADIOLOGY | Facility: HOSPITAL | Age: OVER 89
DRG: 064 | End: 2025-08-23
Payer: MEDICARE

## 2025-08-23 LAB
AORTIC VALVE MEAN GRADIENT: 4 MMHG
AORTIC VALVE PEAK VELOCITY: 1.23 M/S
AV PEAK GRADIENT: 6 MMHG
AVA (PEAK VEL): 3.14 CM2
AVA (VTI): 3.32 CM2
CHOLEST SERPL-MCNC: 161 MG/DL (ref 0–199)
CHOLESTEROL/HDL RATIO: 2.6
EJECTION FRACTION APICAL 4 CHAMBER: 76.3
EJECTION FRACTION: 68 %
EST. AVERAGE GLUCOSE BLD GHB EST-MCNC: 126 MG/DL
HBA1C MFR BLD: 6 % (ref ?–5.7)
HDLC SERPL-MCNC: 63 MG/DL
HOLD SPECIMEN: NORMAL
LDLC SERPL CALC-MCNC: 78 MG/DL
LEFT ATRIUM VOLUME AREA LENGTH INDEX BSA: 21.1 ML/M2
LEFT VENTRICLE INTERNAL DIMENSION DIASTOLE: 3.1 CM (ref 3.5–6)
LEFT VENTRICULAR OUTFLOW TRACT DIAMETER: 2 CM
MITRAL VALVE E/A RATIO: 0.63
NON HDL CHOLESTEROL: 98 MG/DL (ref 0–149)
RIGHT VENTRICLE FREE WALL PEAK S': 21.8 CM/S
RIGHT VENTRICLE PEAK SYSTOLIC PRESSURE: 27 MMHG
TRICUSPID ANNULAR PLANE SYSTOLIC EXCURSION: 2.2 CM
TRIGL SERPL-MCNC: 101 MG/DL (ref 0–149)
VLDL: 20 MG/DL (ref 0–40)

## 2025-08-23 PROCEDURE — 70544 MR ANGIOGRAPHY HEAD W/O DYE: CPT

## 2025-08-23 PROCEDURE — 1200000002 HC GENERAL ROOM WITH TELEMETRY DAILY

## 2025-08-23 PROCEDURE — 36415 COLL VENOUS BLD VENIPUNCTURE: CPT | Performed by: INTERNAL MEDICINE

## 2025-08-23 PROCEDURE — 70551 MRI BRAIN STEM W/O DYE: CPT | Performed by: RADIOLOGY

## 2025-08-23 PROCEDURE — 2500000005 HC RX 250 GENERAL PHARMACY W/O HCPCS: Performed by: INTERNAL MEDICINE

## 2025-08-23 PROCEDURE — 97161 PT EVAL LOW COMPLEX 20 MIN: CPT | Mod: GP

## 2025-08-23 PROCEDURE — 80061 LIPID PANEL: CPT | Performed by: INTERNAL MEDICINE

## 2025-08-23 PROCEDURE — 2500000001 HC RX 250 WO HCPCS SELF ADMINISTERED DRUGS (ALT 637 FOR MEDICARE OP): Performed by: INTERNAL MEDICINE

## 2025-08-23 PROCEDURE — 93306 TTE W/DOPPLER COMPLETE: CPT | Performed by: INTERNAL MEDICINE

## 2025-08-23 PROCEDURE — 97166 OT EVAL MOD COMPLEX 45 MIN: CPT | Mod: GO

## 2025-08-23 PROCEDURE — 70551 MRI BRAIN STEM W/O DYE: CPT

## 2025-08-23 PROCEDURE — 70547 MR ANGIOGRAPHY NECK W/O DYE: CPT

## 2025-08-23 PROCEDURE — 93306 TTE W/DOPPLER COMPLETE: CPT

## 2025-08-23 PROCEDURE — 70547 MR ANGIOGRAPHY NECK W/O DYE: CPT | Performed by: RADIOLOGY

## 2025-08-23 PROCEDURE — 2500000004 HC RX 250 GENERAL PHARMACY W/ HCPCS (ALT 636 FOR OP/ED): Performed by: INTERNAL MEDICINE

## 2025-08-23 PROCEDURE — 99233 SBSQ HOSP IP/OBS HIGH 50: CPT | Performed by: INTERNAL MEDICINE

## 2025-08-23 PROCEDURE — 99223 1ST HOSP IP/OBS HIGH 75: CPT | Performed by: NURSE PRACTITIONER

## 2025-08-23 PROCEDURE — 70544 MR ANGIOGRAPHY HEAD W/O DYE: CPT | Performed by: RADIOLOGY

## 2025-08-23 RX ORDER — CLOPIDOGREL BISULFATE 75 MG/1
75 TABLET ORAL DAILY
Status: DISCONTINUED | OUTPATIENT
Start: 2025-08-23 | End: 2025-08-25

## 2025-08-23 RX ADMIN — ATORVASTATIN CALCIUM 40 MG: 40 TABLET, FILM COATED ORAL at 20:20

## 2025-08-23 RX ADMIN — HEPARIN SODIUM 5000 UNITS: 5000 INJECTION, SOLUTION INTRAVENOUS; SUBCUTANEOUS at 13:41

## 2025-08-23 RX ADMIN — HEPARIN SODIUM 5000 UNITS: 5000 INJECTION, SOLUTION INTRAVENOUS; SUBCUTANEOUS at 04:56

## 2025-08-23 RX ADMIN — Medication 500 MG: at 06:43

## 2025-08-23 RX ADMIN — ASPIRIN 81 MG: 81 TABLET, COATED ORAL at 09:22

## 2025-08-23 RX ADMIN — HEPARIN SODIUM 5000 UNITS: 5000 INJECTION, SOLUTION INTRAVENOUS; SUBCUTANEOUS at 21:02

## 2025-08-23 RX ADMIN — CLOPIDOGREL BISULFATE 75 MG: 75 TABLET, FILM COATED ORAL at 18:32

## 2025-08-23 RX ADMIN — AMLODIPINE BESYLATE 10 MG: 10 TABLET ORAL at 09:22

## 2025-08-23 RX ADMIN — Medication 3 MG: at 20:20

## 2025-08-23 RX ADMIN — LOSARTAN POTASSIUM 100 MG: 50 TABLET, FILM COATED ORAL at 09:22

## 2025-08-23 RX ADMIN — CYANOCOBALAMIN TAB 1000 MCG 500 MCG: 1000 TAB at 06:43

## 2025-08-23 ASSESSMENT — COGNITIVE AND FUNCTIONAL STATUS - GENERAL
CLIMB 3 TO 5 STEPS WITH RAILING: A LITTLE
DAILY ACTIVITIY SCORE: 23
HELP NEEDED FOR BATHING: A LITTLE
MOBILITY SCORE: 23

## 2025-08-23 ASSESSMENT — ACTIVITIES OF DAILY LIVING (ADL)
BATHING_ASSISTANCE: INDEPENDENT
ADL_ASSISTANCE: INDEPENDENT

## 2025-08-23 ASSESSMENT — PAIN - FUNCTIONAL ASSESSMENT
PAIN_FUNCTIONAL_ASSESSMENT: 0-10

## 2025-08-23 ASSESSMENT — PAIN SCALES - GENERAL
PAINLEVEL_OUTOF10: 0 - NO PAIN

## 2025-08-23 ASSESSMENT — VISUAL ACUITY: VA_NORMAL: 1

## 2025-08-24 ENCOUNTER — APPOINTMENT (OUTPATIENT)
Dept: RADIOLOGY | Facility: HOSPITAL | Age: OVER 89
DRG: 064 | End: 2025-08-24
Payer: MEDICARE

## 2025-08-24 LAB
ALBUMIN SERPL BCP-MCNC: 3.8 G/DL (ref 3.4–5)
ANION GAP SERPL CALC-SCNC: 11 MMOL/L (ref 10–20)
BUN SERPL-MCNC: 24 MG/DL (ref 6–23)
CALCIUM SERPL-MCNC: 8.7 MG/DL (ref 8.6–10.3)
CHLORIDE SERPL-SCNC: 106 MMOL/L (ref 98–107)
CO2 SERPL-SCNC: 26 MMOL/L (ref 21–32)
CREAT SERPL-MCNC: 1.05 MG/DL (ref 0.5–1.05)
EGFRCR SERPLBLD CKD-EPI 2021: 49 ML/MIN/1.73M*2
ERYTHROCYTE [DISTWIDTH] IN BLOOD BY AUTOMATED COUNT: 14.5 % (ref 11.5–14.5)
GLUCOSE BLD MANUAL STRIP-MCNC: 102 MG/DL (ref 74–99)
GLUCOSE BLD MANUAL STRIP-MCNC: 107 MG/DL (ref 74–99)
GLUCOSE BLD MANUAL STRIP-MCNC: 132 MG/DL (ref 74–99)
GLUCOSE BLD MANUAL STRIP-MCNC: 98 MG/DL (ref 74–99)
GLUCOSE SERPL-MCNC: 112 MG/DL (ref 74–99)
HCT VFR BLD AUTO: 39.8 % (ref 36–46)
HGB BLD-MCNC: 12.6 G/DL (ref 12–16)
MCH RBC QN AUTO: 30.1 PG (ref 26–34)
MCHC RBC AUTO-ENTMCNC: 31.7 G/DL (ref 32–36)
MCV RBC AUTO: 95 FL (ref 80–100)
NRBC BLD-RTO: 0 /100 WBCS (ref 0–0)
PHOSPHATE SERPL-MCNC: 3.2 MG/DL (ref 2.5–4.9)
PLATELET # BLD AUTO: 251 X10*3/UL (ref 150–450)
POTASSIUM SERPL-SCNC: 3.7 MMOL/L (ref 3.5–5.3)
RBC # BLD AUTO: 4.19 X10*6/UL (ref 4–5.2)
SODIUM SERPL-SCNC: 139 MMOL/L (ref 136–145)
WBC # BLD AUTO: 14.2 X10*3/UL (ref 4.4–11.3)

## 2025-08-24 PROCEDURE — 70496 CT ANGIOGRAPHY HEAD: CPT | Performed by: RADIOLOGY

## 2025-08-24 PROCEDURE — 82947 ASSAY GLUCOSE BLOOD QUANT: CPT

## 2025-08-24 PROCEDURE — 2550000001 HC RX 255 CONTRASTS: Performed by: INTERNAL MEDICINE

## 2025-08-24 PROCEDURE — 85027 COMPLETE CBC AUTOMATED: CPT | Performed by: INTERNAL MEDICINE

## 2025-08-24 PROCEDURE — 2060000001 HC INTERMEDIATE ICU ROOM DAILY

## 2025-08-24 PROCEDURE — 80069 RENAL FUNCTION PANEL: CPT | Performed by: INTERNAL MEDICINE

## 2025-08-24 PROCEDURE — 2500000001 HC RX 250 WO HCPCS SELF ADMINISTERED DRUGS (ALT 637 FOR MEDICARE OP): Performed by: INTERNAL MEDICINE

## 2025-08-24 PROCEDURE — 36415 COLL VENOUS BLD VENIPUNCTURE: CPT | Performed by: INTERNAL MEDICINE

## 2025-08-24 PROCEDURE — 2500000004 HC RX 250 GENERAL PHARMACY W/ HCPCS (ALT 636 FOR OP/ED): Performed by: INTERNAL MEDICINE

## 2025-08-24 PROCEDURE — 70498 CT ANGIOGRAPHY NECK: CPT

## 2025-08-24 PROCEDURE — 99233 SBSQ HOSP IP/OBS HIGH 50: CPT | Performed by: INTERNAL MEDICINE

## 2025-08-24 PROCEDURE — 70498 CT ANGIOGRAPHY NECK: CPT | Performed by: RADIOLOGY

## 2025-08-24 PROCEDURE — 2500000005 HC RX 250 GENERAL PHARMACY W/O HCPCS: Performed by: INTERNAL MEDICINE

## 2025-08-24 RX ORDER — ASPIRIN 81 MG/1
81 TABLET ORAL DAILY
Qty: 30 TABLET | Refills: 2 | Status: SHIPPED | OUTPATIENT
Start: 2025-08-25 | End: 2025-08-27 | Stop reason: HOSPADM

## 2025-08-24 RX ORDER — CLOPIDOGREL BISULFATE 75 MG/1
75 TABLET ORAL DAILY
Qty: 21 TABLET | Refills: 0 | Status: SHIPPED | OUTPATIENT
Start: 2025-08-25 | End: 2025-08-27 | Stop reason: HOSPADM

## 2025-08-24 RX ORDER — LABETALOL HYDROCHLORIDE 5 MG/ML
5 INJECTION, SOLUTION INTRAVENOUS EVERY 4 HOURS PRN
Status: DISCONTINUED | OUTPATIENT
Start: 2025-08-24 | End: 2025-08-27 | Stop reason: HOSPADM

## 2025-08-24 RX ADMIN — ATORVASTATIN CALCIUM 40 MG: 40 TABLET, FILM COATED ORAL at 21:45

## 2025-08-24 RX ADMIN — ASPIRIN 81 MG: 81 TABLET, COATED ORAL at 08:42

## 2025-08-24 RX ADMIN — AMLODIPINE BESYLATE 10 MG: 10 TABLET ORAL at 08:42

## 2025-08-24 RX ADMIN — CYANOCOBALAMIN TAB 1000 MCG 500 MCG: 1000 TAB at 06:12

## 2025-08-24 RX ADMIN — CLOPIDOGREL BISULFATE 75 MG: 75 TABLET, FILM COATED ORAL at 08:42

## 2025-08-24 RX ADMIN — Medication 500 MG: at 06:12

## 2025-08-24 RX ADMIN — IOHEXOL 75 ML: 350 INJECTION, SOLUTION INTRAVENOUS at 15:42

## 2025-08-24 RX ADMIN — HEPARIN SODIUM 5000 UNITS: 5000 INJECTION, SOLUTION INTRAVENOUS; SUBCUTANEOUS at 06:12

## 2025-08-24 RX ADMIN — LOSARTAN POTASSIUM 100 MG: 50 TABLET, FILM COATED ORAL at 08:42

## 2025-08-24 RX ADMIN — Medication 3 MG: at 21:45

## 2025-08-24 RX ADMIN — SODIUM CHLORIDE 1000 ML: 0.9 INJECTION, SOLUTION INTRAVENOUS at 12:04

## 2025-08-24 RX ADMIN — HEPARIN SODIUM 5000 UNITS: 5000 INJECTION, SOLUTION INTRAVENOUS; SUBCUTANEOUS at 14:06

## 2025-08-24 ASSESSMENT — PAIN SCALES - GENERAL
PAINLEVEL_OUTOF10: 0 - NO PAIN
PAINLEVEL_OUTOF10: 0 - NO PAIN

## 2025-08-24 ASSESSMENT — COGNITIVE AND FUNCTIONAL STATUS - GENERAL
WALKING IN HOSPITAL ROOM: A LITTLE
DAILY ACTIVITIY SCORE: 23
DRESSING REGULAR LOWER BODY CLOTHING: A LITTLE
STANDING UP FROM CHAIR USING ARMS: A LITTLE
MOBILITY SCORE: 20
MOVING TO AND FROM BED TO CHAIR: A LITTLE
CLIMB 3 TO 5 STEPS WITH RAILING: A LITTLE

## 2025-08-24 ASSESSMENT — PAIN - FUNCTIONAL ASSESSMENT: PAIN_FUNCTIONAL_ASSESSMENT: 0-10

## 2025-08-25 ENCOUNTER — APPOINTMENT (OUTPATIENT)
Dept: CARDIOLOGY | Facility: HOSPITAL | Age: OVER 89
DRG: 064 | End: 2025-08-25
Payer: MEDICARE

## 2025-08-25 VITALS
RESPIRATION RATE: 16 BRPM | OXYGEN SATURATION: 92 % | SYSTOLIC BLOOD PRESSURE: 144 MMHG | TEMPERATURE: 97.7 F | HEART RATE: 95 BPM | BODY MASS INDEX: 20.69 KG/M2 | WEIGHT: 112.43 LBS | DIASTOLIC BLOOD PRESSURE: 72 MMHG | HEIGHT: 62 IN

## 2025-08-25 LAB
ALBUMIN SERPL BCP-MCNC: 3.8 G/DL (ref 3.4–5)
ANION GAP SERPL CALC-SCNC: 10 MMOL/L (ref 10–20)
BODY SURFACE AREA: 1.49 M2
BUN SERPL-MCNC: 20 MG/DL (ref 6–23)
CALCIUM SERPL-MCNC: 9.1 MG/DL (ref 8.6–10.3)
CHLORIDE SERPL-SCNC: 108 MMOL/L (ref 98–107)
CO2 SERPL-SCNC: 27 MMOL/L (ref 21–32)
CREAT SERPL-MCNC: 0.95 MG/DL (ref 0.5–1.05)
EGFRCR SERPLBLD CKD-EPI 2021: 56 ML/MIN/1.73M*2
ERYTHROCYTE [DISTWIDTH] IN BLOOD BY AUTOMATED COUNT: 14.3 % (ref 11.5–14.5)
GLUCOSE BLD MANUAL STRIP-MCNC: 122 MG/DL (ref 74–99)
GLUCOSE BLD MANUAL STRIP-MCNC: 127 MG/DL (ref 74–99)
GLUCOSE BLD MANUAL STRIP-MCNC: 144 MG/DL (ref 74–99)
GLUCOSE SERPL-MCNC: 106 MG/DL (ref 74–99)
HCT VFR BLD AUTO: 38.6 % (ref 36–46)
HGB BLD-MCNC: 12 G/DL (ref 12–16)
HOLD SPECIMEN: NORMAL
MCH RBC QN AUTO: 29.9 PG (ref 26–34)
MCHC RBC AUTO-ENTMCNC: 31.1 G/DL (ref 32–36)
MCV RBC AUTO: 96 FL (ref 80–100)
NRBC BLD-RTO: 0 /100 WBCS (ref 0–0)
PHOSPHATE SERPL-MCNC: 3.1 MG/DL (ref 2.5–4.9)
PLATELET # BLD AUTO: 244 X10*3/UL (ref 150–450)
POTASSIUM SERPL-SCNC: 3.7 MMOL/L (ref 3.5–5.3)
RBC # BLD AUTO: 4.02 X10*6/UL (ref 4–5.2)
SODIUM SERPL-SCNC: 141 MMOL/L (ref 136–145)
WBC # BLD AUTO: 8.8 X10*3/UL (ref 4.4–11.3)

## 2025-08-25 PROCEDURE — 99223 1ST HOSP IP/OBS HIGH 75: CPT | Performed by: STUDENT IN AN ORGANIZED HEALTH CARE EDUCATION/TRAINING PROGRAM

## 2025-08-25 PROCEDURE — 80069 RENAL FUNCTION PANEL: CPT | Performed by: INTERNAL MEDICINE

## 2025-08-25 PROCEDURE — 36415 COLL VENOUS BLD VENIPUNCTURE: CPT | Performed by: INTERNAL MEDICINE

## 2025-08-25 PROCEDURE — 99233 SBSQ HOSP IP/OBS HIGH 50: CPT | Performed by: INTERNAL MEDICINE

## 2025-08-25 PROCEDURE — 82947 ASSAY GLUCOSE BLOOD QUANT: CPT

## 2025-08-25 PROCEDURE — 2500000004 HC RX 250 GENERAL PHARMACY W/ HCPCS (ALT 636 FOR OP/ED): Performed by: INTERNAL MEDICINE

## 2025-08-25 PROCEDURE — 99223 1ST HOSP IP/OBS HIGH 75: CPT

## 2025-08-25 PROCEDURE — 2500000001 HC RX 250 WO HCPCS SELF ADMINISTERED DRUGS (ALT 637 FOR MEDICARE OP): Performed by: INTERNAL MEDICINE

## 2025-08-25 PROCEDURE — 2060000001 HC INTERMEDIATE ICU ROOM DAILY

## 2025-08-25 PROCEDURE — 2500000005 HC RX 250 GENERAL PHARMACY W/O HCPCS: Performed by: INTERNAL MEDICINE

## 2025-08-25 PROCEDURE — 85027 COMPLETE CBC AUTOMATED: CPT | Performed by: INTERNAL MEDICINE

## 2025-08-25 PROCEDURE — 93246 EXT ECG>7D<15D RECORDING: CPT

## 2025-08-25 RX ADMIN — ATORVASTATIN CALCIUM 40 MG: 40 TABLET, FILM COATED ORAL at 20:48

## 2025-08-25 RX ADMIN — CLOPIDOGREL BISULFATE 75 MG: 75 TABLET, FILM COATED ORAL at 08:57

## 2025-08-25 RX ADMIN — AMLODIPINE BESYLATE 10 MG: 10 TABLET ORAL at 08:57

## 2025-08-25 RX ADMIN — ASPIRIN 81 MG: 81 TABLET, COATED ORAL at 08:57

## 2025-08-25 RX ADMIN — Medication 3 MG: at 20:48

## 2025-08-25 RX ADMIN — CYANOCOBALAMIN TAB 1000 MCG 500 MCG: 1000 TAB at 08:59

## 2025-08-25 RX ADMIN — HEPARIN SODIUM 5000 UNITS: 5000 INJECTION, SOLUTION INTRAVENOUS; SUBCUTANEOUS at 13:59

## 2025-08-25 RX ADMIN — Medication 500 MG: at 08:59

## 2025-08-25 RX ADMIN — HEPARIN SODIUM 5000 UNITS: 5000 INJECTION, SOLUTION INTRAVENOUS; SUBCUTANEOUS at 20:48

## 2025-08-25 RX ADMIN — LOSARTAN POTASSIUM 100 MG: 50 TABLET, FILM COATED ORAL at 08:57

## 2025-08-25 ASSESSMENT — PAIN SCALES - GENERAL
PAINLEVEL_OUTOF10: 0 - NO PAIN

## 2025-08-25 ASSESSMENT — PAIN - FUNCTIONAL ASSESSMENT
PAIN_FUNCTIONAL_ASSESSMENT: 0-10

## 2025-08-26 ENCOUNTER — APPOINTMENT (OUTPATIENT)
Dept: CARDIOLOGY | Facility: HOSPITAL | Age: OVER 89
DRG: 064 | End: 2025-08-26
Payer: MEDICARE

## 2025-08-26 PROBLEM — K13.70 ORAL LESION: Status: ACTIVE | Noted: 2025-08-26

## 2025-08-26 PROBLEM — K86.2 CYST OF PANCREAS (HHS-HCC): Status: ACTIVE | Noted: 2025-08-26

## 2025-08-26 PROBLEM — R41.0 DISORIENTED: Status: ACTIVE | Noted: 2025-08-22

## 2025-08-26 PROBLEM — R92.8 ABNORMAL MAMMOGRAM: Status: ACTIVE | Noted: 2025-08-26

## 2025-08-26 PROBLEM — M19.90 OSTEOARTHRITIS: Status: ACTIVE | Noted: 2025-08-26

## 2025-08-26 PROBLEM — K62.3 RECTAL PROLAPSE: Status: ACTIVE | Noted: 2025-03-18

## 2025-08-26 PROBLEM — H91.90 HOH (HARD OF HEARING): Status: ACTIVE | Noted: 2025-08-26

## 2025-08-26 LAB
ALBUMIN SERPL BCP-MCNC: 3.8 G/DL (ref 3.4–5)
ANION GAP SERPL CALC-SCNC: 9 MMOL/L (ref 10–20)
BUN SERPL-MCNC: 15 MG/DL (ref 6–23)
CALCIUM SERPL-MCNC: 9.3 MG/DL (ref 8.6–10.3)
CHLORIDE SERPL-SCNC: 107 MMOL/L (ref 98–107)
CO2 SERPL-SCNC: 29 MMOL/L (ref 21–32)
CREAT SERPL-MCNC: 0.92 MG/DL (ref 0.5–1.05)
EGFRCR SERPLBLD CKD-EPI 2021: 58 ML/MIN/1.73M*2
EJECTION FRACTION: 58 %
ERYTHROCYTE [DISTWIDTH] IN BLOOD BY AUTOMATED COUNT: 14.5 % (ref 11.5–14.5)
GLUCOSE BLD MANUAL STRIP-MCNC: 109 MG/DL (ref 74–99)
GLUCOSE BLD MANUAL STRIP-MCNC: 115 MG/DL (ref 74–99)
GLUCOSE BLD MANUAL STRIP-MCNC: 125 MG/DL (ref 74–99)
GLUCOSE BLD MANUAL STRIP-MCNC: 151 MG/DL (ref 74–99)
GLUCOSE SERPL-MCNC: 100 MG/DL (ref 74–99)
HCT VFR BLD AUTO: 39.2 % (ref 36–46)
HGB BLD-MCNC: 12.3 G/DL (ref 12–16)
MCH RBC QN AUTO: 30 PG (ref 26–34)
MCHC RBC AUTO-ENTMCNC: 31.4 G/DL (ref 32–36)
MCV RBC AUTO: 96 FL (ref 80–100)
NRBC BLD-RTO: 0 /100 WBCS (ref 0–0)
PHOSPHATE SERPL-MCNC: 3.7 MG/DL (ref 2.5–4.9)
PLATELET # BLD AUTO: 246 X10*3/UL (ref 150–450)
POTASSIUM SERPL-SCNC: 3.7 MMOL/L (ref 3.5–5.3)
RBC # BLD AUTO: 4.1 X10*6/UL (ref 4–5.2)
SODIUM SERPL-SCNC: 141 MMOL/L (ref 136–145)
WBC # BLD AUTO: 7.4 X10*3/UL (ref 4.4–11.3)

## 2025-08-26 PROCEDURE — 99232 SBSQ HOSP IP/OBS MODERATE 35: CPT | Performed by: STUDENT IN AN ORGANIZED HEALTH CARE EDUCATION/TRAINING PROGRAM

## 2025-08-26 PROCEDURE — 99152 MOD SED SAME PHYS/QHP 5/>YRS: CPT

## 2025-08-26 PROCEDURE — 93320 DOPPLER ECHO COMPLETE: CPT | Performed by: STUDENT IN AN ORGANIZED HEALTH CARE EDUCATION/TRAINING PROGRAM

## 2025-08-26 PROCEDURE — B24BZZ4 ULTRASONOGRAPHY OF HEART WITH AORTA, TRANSESOPHAGEAL: ICD-10-PCS | Performed by: STUDENT IN AN ORGANIZED HEALTH CARE EDUCATION/TRAINING PROGRAM

## 2025-08-26 PROCEDURE — 93320 DOPPLER ECHO COMPLETE: CPT

## 2025-08-26 PROCEDURE — 80069 RENAL FUNCTION PANEL: CPT | Performed by: INTERNAL MEDICINE

## 2025-08-26 PROCEDURE — 2500000004 HC RX 250 GENERAL PHARMACY W/ HCPCS (ALT 636 FOR OP/ED): Performed by: INTERNAL MEDICINE

## 2025-08-26 PROCEDURE — B24CZZ4 ULTRASONOGRAPHY OF PERICARDIUM, TRANSESOPHAGEAL: ICD-10-PCS | Performed by: STUDENT IN AN ORGANIZED HEALTH CARE EDUCATION/TRAINING PROGRAM

## 2025-08-26 PROCEDURE — 99232 SBSQ HOSP IP/OBS MODERATE 35: CPT | Performed by: NURSE PRACTITIONER

## 2025-08-26 PROCEDURE — 2060000001 HC INTERMEDIATE ICU ROOM DAILY

## 2025-08-26 PROCEDURE — 2500000005 HC RX 250 GENERAL PHARMACY W/O HCPCS: Performed by: INTERNAL MEDICINE

## 2025-08-26 PROCEDURE — 2500000001 HC RX 250 WO HCPCS SELF ADMINISTERED DRUGS (ALT 637 FOR MEDICARE OP): Performed by: NURSE PRACTITIONER

## 2025-08-26 PROCEDURE — 36415 COLL VENOUS BLD VENIPUNCTURE: CPT | Performed by: INTERNAL MEDICINE

## 2025-08-26 PROCEDURE — 99233 SBSQ HOSP IP/OBS HIGH 50: CPT | Performed by: STUDENT IN AN ORGANIZED HEALTH CARE EDUCATION/TRAINING PROGRAM

## 2025-08-26 PROCEDURE — 82947 ASSAY GLUCOSE BLOOD QUANT: CPT

## 2025-08-26 PROCEDURE — 2500000001 HC RX 250 WO HCPCS SELF ADMINISTERED DRUGS (ALT 637 FOR MEDICARE OP): Performed by: INTERNAL MEDICINE

## 2025-08-26 PROCEDURE — 85027 COMPLETE CBC AUTOMATED: CPT | Performed by: INTERNAL MEDICINE

## 2025-08-26 PROCEDURE — 93312 ECHO TRANSESOPHAGEAL: CPT | Performed by: STUDENT IN AN ORGANIZED HEALTH CARE EDUCATION/TRAINING PROGRAM

## 2025-08-26 PROCEDURE — 7100000009 HC PHASE TWO TIME - INITIAL BASE CHARGE

## 2025-08-26 PROCEDURE — 93325 DOPPLER ECHO COLOR FLOW MAPG: CPT | Performed by: STUDENT IN AN ORGANIZED HEALTH CARE EDUCATION/TRAINING PROGRAM

## 2025-08-26 PROCEDURE — 2500000004 HC RX 250 GENERAL PHARMACY W/ HCPCS (ALT 636 FOR OP/ED): Performed by: STUDENT IN AN ORGANIZED HEALTH CARE EDUCATION/TRAINING PROGRAM

## 2025-08-26 PROCEDURE — 7100000010 HC PHASE TWO TIME - EACH INCREMENTAL 1 MINUTE

## 2025-08-26 PROCEDURE — 2500000005 HC RX 250 GENERAL PHARMACY W/O HCPCS: Performed by: STUDENT IN AN ORGANIZED HEALTH CARE EDUCATION/TRAINING PROGRAM

## 2025-08-26 RX ORDER — MIDAZOLAM HYDROCHLORIDE 1 MG/ML
INJECTION, SOLUTION INTRAMUSCULAR; INTRAVENOUS AS NEEDED
Status: DISCONTINUED | OUTPATIENT
Start: 2025-08-26 | End: 2025-08-26 | Stop reason: HOSPADM

## 2025-08-26 RX ORDER — FENTANYL CITRATE 50 UG/ML
INJECTION, SOLUTION INTRAMUSCULAR; INTRAVENOUS AS NEEDED
Status: DISCONTINUED | OUTPATIENT
Start: 2025-08-26 | End: 2025-08-26 | Stop reason: HOSPADM

## 2025-08-26 RX ORDER — LIDOCAINE HYDROCHLORIDE 20 MG/ML
SOLUTION OROPHARYNGEAL AS NEEDED
Status: DISCONTINUED | OUTPATIENT
Start: 2025-08-26 | End: 2025-08-26 | Stop reason: HOSPADM

## 2025-08-26 RX ADMIN — AMLODIPINE BESYLATE 10 MG: 10 TABLET ORAL at 09:11

## 2025-08-26 RX ADMIN — BENZOCAINE, BUTAMBEN, AND TETRACAINE HYDROCHLORIDE 2 SPRAY: .028; .004; .004 AEROSOL, SPRAY TOPICAL at 13:17

## 2025-08-26 RX ADMIN — Medication 500 MG: at 06:16

## 2025-08-26 RX ADMIN — Medication 1 DOSE: at 13:03

## 2025-08-26 RX ADMIN — LOSARTAN POTASSIUM 100 MG: 50 TABLET, FILM COATED ORAL at 09:11

## 2025-08-26 RX ADMIN — Medication 3 MG: at 20:01

## 2025-08-26 RX ADMIN — FENTANYL CITRATE 50 MCG: 50 INJECTION, SOLUTION INTRAMUSCULAR; INTRAVENOUS at 13:20

## 2025-08-26 RX ADMIN — LIDOCAINE HYDROCHLORIDE 15 ML: 20 SOLUTION ORAL; TOPICAL at 13:18

## 2025-08-26 RX ADMIN — APIXABAN 2.5 MG: 2.5 TABLET, FILM COATED ORAL at 20:01

## 2025-08-26 RX ADMIN — ATORVASTATIN CALCIUM 40 MG: 40 TABLET, FILM COATED ORAL at 20:02

## 2025-08-26 RX ADMIN — HEPARIN SODIUM 5000 UNITS: 5000 INJECTION, SOLUTION INTRAVENOUS; SUBCUTANEOUS at 06:16

## 2025-08-26 RX ADMIN — MIDAZOLAM 1 MG: 1 INJECTION INTRAMUSCULAR; INTRAVENOUS at 13:20

## 2025-08-26 RX ADMIN — CYANOCOBALAMIN TAB 1000 MCG 500 MCG: 1000 TAB at 06:16

## 2025-08-26 ASSESSMENT — COGNITIVE AND FUNCTIONAL STATUS - GENERAL
WALKING IN HOSPITAL ROOM: A LITTLE
DRESSING REGULAR LOWER BODY CLOTHING: A LITTLE
CLIMB 3 TO 5 STEPS WITH RAILING: A LITTLE
STANDING UP FROM CHAIR USING ARMS: A LITTLE
MOBILITY SCORE: 23
DAILY ACTIVITIY SCORE: 24
CLIMB 3 TO 5 STEPS WITH RAILING: A LITTLE
DAILY ACTIVITIY SCORE: 23
MOVING TO AND FROM BED TO CHAIR: A LITTLE
MOBILITY SCORE: 20

## 2025-08-26 ASSESSMENT — PAIN - FUNCTIONAL ASSESSMENT
PAIN_FUNCTIONAL_ASSESSMENT: 0-10

## 2025-08-26 ASSESSMENT — PAIN SCALES - GENERAL
PAINLEVEL_OUTOF10: 0 - NO PAIN

## 2025-08-27 ENCOUNTER — APPOINTMENT (OUTPATIENT)
Dept: RADIOLOGY | Facility: HOSPITAL | Age: OVER 89
DRG: 064 | End: 2025-08-27
Payer: MEDICARE

## 2025-08-27 VITALS
HEIGHT: 62 IN | WEIGHT: 112.43 LBS | SYSTOLIC BLOOD PRESSURE: 141 MMHG | RESPIRATION RATE: 17 BRPM | BODY MASS INDEX: 20.69 KG/M2 | DIASTOLIC BLOOD PRESSURE: 79 MMHG | HEART RATE: 88 BPM | OXYGEN SATURATION: 95 % | TEMPERATURE: 97 F

## 2025-08-27 PROBLEM — I63.9 STROKE OF UNKNOWN ETIOLOGY (MULTI): Status: RESOLVED | Noted: 2025-08-22 | Resolved: 2025-08-27

## 2025-08-27 LAB — GLUCOSE BLD MANUAL STRIP-MCNC: 104 MG/DL (ref 74–99)

## 2025-08-27 PROCEDURE — 70450 CT HEAD/BRAIN W/O DYE: CPT

## 2025-08-27 PROCEDURE — 99233 SBSQ HOSP IP/OBS HIGH 50: CPT | Performed by: STUDENT IN AN ORGANIZED HEALTH CARE EDUCATION/TRAINING PROGRAM

## 2025-08-27 PROCEDURE — 2500000001 HC RX 250 WO HCPCS SELF ADMINISTERED DRUGS (ALT 637 FOR MEDICARE OP): Performed by: INTERNAL MEDICINE

## 2025-08-27 PROCEDURE — 82947 ASSAY GLUCOSE BLOOD QUANT: CPT

## 2025-08-27 PROCEDURE — 99239 HOSP IP/OBS DSCHRG MGMT >30: CPT | Performed by: STUDENT IN AN ORGANIZED HEALTH CARE EDUCATION/TRAINING PROGRAM

## 2025-08-27 PROCEDURE — 70450 CT HEAD/BRAIN W/O DYE: CPT | Performed by: RADIOLOGY

## 2025-08-27 PROCEDURE — 99233 SBSQ HOSP IP/OBS HIGH 50: CPT | Performed by: PSYCHIATRY & NEUROLOGY

## 2025-08-27 PROCEDURE — 2500000001 HC RX 250 WO HCPCS SELF ADMINISTERED DRUGS (ALT 637 FOR MEDICARE OP): Performed by: NURSE PRACTITIONER

## 2025-08-27 PROCEDURE — 99222 1ST HOSP IP/OBS MODERATE 55: CPT | Performed by: NURSE PRACTITIONER

## 2025-08-27 RX ORDER — ATORVASTATIN CALCIUM 40 MG/1
40 TABLET, FILM COATED ORAL NIGHTLY
Qty: 90 TABLET | Refills: 1 | Status: SHIPPED | OUTPATIENT
Start: 2025-08-27

## 2025-08-27 RX ADMIN — Medication 500 MG: at 09:29

## 2025-08-27 RX ADMIN — AMLODIPINE BESYLATE 10 MG: 10 TABLET ORAL at 09:27

## 2025-08-27 RX ADMIN — APIXABAN 2.5 MG: 2.5 TABLET, FILM COATED ORAL at 09:27

## 2025-08-27 RX ADMIN — LOSARTAN POTASSIUM 100 MG: 50 TABLET, FILM COATED ORAL at 09:27

## 2025-08-27 RX ADMIN — CYANOCOBALAMIN TAB 1000 MCG 500 MCG: 1000 TAB at 09:29

## 2025-08-27 ASSESSMENT — PAIN SCALES - GENERAL: PAINLEVEL_OUTOF10: 0 - NO PAIN

## 2025-08-27 ASSESSMENT — PAIN - FUNCTIONAL ASSESSMENT: PAIN_FUNCTIONAL_ASSESSMENT: 0-10

## 2025-08-28 ENCOUNTER — PATIENT OUTREACH (OUTPATIENT)
Dept: PRIMARY CARE | Facility: CLINIC | Age: OVER 89
End: 2025-08-28
Payer: MEDICARE

## 2025-09-02 ENCOUNTER — APPOINTMENT (OUTPATIENT)
Dept: PRIMARY CARE | Facility: CLINIC | Age: OVER 89
End: 2025-09-02
Payer: MEDICARE

## 2025-09-04 ENCOUNTER — APPOINTMENT (OUTPATIENT)
Dept: PRIMARY CARE | Facility: CLINIC | Age: OVER 89
End: 2025-09-04
Payer: MEDICARE

## 2025-09-04 ENCOUNTER — TELEPHONE (OUTPATIENT)
Dept: PRIMARY CARE | Facility: CLINIC | Age: OVER 89
End: 2025-09-04

## 2025-09-04 DIAGNOSIS — N39.0 URINARY TRACT INFECTION WITHOUT HEMATURIA, SITE UNSPECIFIED: Primary | ICD-10-CM

## 2025-09-04 ASSESSMENT — PATIENT HEALTH QUESTIONNAIRE - PHQ9
2. FEELING DOWN, DEPRESSED OR HOPELESS: SEVERAL DAYS
SUM OF ALL RESPONSES TO PHQ9 QUESTIONS 1 AND 2: 1
1. LITTLE INTEREST OR PLEASURE IN DOING THINGS: NOT AT ALL

## 2025-09-04 ASSESSMENT — ENCOUNTER SYMPTOMS
LOSS OF SENSATION IN FEET: 0
OCCASIONAL FEELINGS OF UNSTEADINESS: 1
DEPRESSION: 1

## 2025-09-05 RX ORDER — NITROFURANTOIN 25; 75 MG/1; MG/1
100 CAPSULE ORAL 2 TIMES DAILY
Qty: 14 CAPSULE | Refills: 0 | Status: SHIPPED | OUTPATIENT
Start: 2025-09-05 | End: 2025-09-12

## 2025-10-01 ENCOUNTER — APPOINTMENT (OUTPATIENT)
Dept: CARDIOLOGY | Facility: CLINIC | Age: OVER 89
End: 2025-10-01
Payer: MEDICARE

## 2025-10-14 ENCOUNTER — APPOINTMENT (OUTPATIENT)
Dept: PRIMARY CARE | Facility: CLINIC | Age: OVER 89
End: 2025-10-14
Payer: MEDICARE

## 2025-12-12 ENCOUNTER — APPOINTMENT (OUTPATIENT)
Dept: PRIMARY CARE | Facility: CLINIC | Age: OVER 89
End: 2025-12-12
Payer: MEDICARE

## 2026-04-01 ENCOUNTER — APPOINTMENT (OUTPATIENT)
Dept: NEUROLOGY | Facility: CLINIC | Age: OVER 89
End: 2026-04-01
Payer: MEDICARE

## 2026-04-22 ENCOUNTER — APPOINTMENT (OUTPATIENT)
Dept: NEUROLOGY | Facility: CLINIC | Age: OVER 89
End: 2026-04-22
Payer: MEDICARE